# Patient Record
Sex: MALE | Race: WHITE | Employment: FULL TIME | URBAN - METROPOLITAN AREA
[De-identification: names, ages, dates, MRNs, and addresses within clinical notes are randomized per-mention and may not be internally consistent; named-entity substitution may affect disease eponyms.]

---

## 2017-01-06 ENCOUNTER — OFFICE VISIT (OUTPATIENT)
Dept: INTERNAL MEDICINE CLINIC | Age: 47
End: 2017-01-06

## 2017-01-06 VITALS
RESPIRATION RATE: 20 BRPM | HEART RATE: 52 BPM | BODY MASS INDEX: 34.07 KG/M2 | WEIGHT: 212 LBS | TEMPERATURE: 98.1 F | HEIGHT: 66 IN | OXYGEN SATURATION: 96 % | SYSTOLIC BLOOD PRESSURE: 119 MMHG | DIASTOLIC BLOOD PRESSURE: 78 MMHG

## 2017-01-06 DIAGNOSIS — J30.1 SEASONAL ALLERGIC RHINITIS DUE TO POLLEN: ICD-10-CM

## 2017-01-06 DIAGNOSIS — J45.40 MODERATE PERSISTENT ASTHMA WITHOUT COMPLICATION: Primary | ICD-10-CM

## 2017-01-06 NOTE — PROGRESS NOTES
Patient is in the office today for a 6 month follow up. Do you have an Advance Directive yes - will bring a copy       1. Have you been to the ER, urgent care clinic since your last visit? Hospitalized since your last visit? No    2. Have you seen or consulted any other health care providers outside of the [address] [place] [address] since your last visit? Include any pap smears or colon screening.  No

## 2017-01-06 NOTE — MR AVS SNAPSHOT
Visit Information Date & Time Provider Department Dept. Phone Encounter #  
 1/6/2017  4:45 PM Francisco Alvarez MD [facility] [phone] Follow-up Instructions Return in about 6 months (around 7/6/2017) for labs 1 week before. Upcoming Health Maintenance Date Due DTaP/Tdap/Td series (1 - Tdap) 9/11/1993 INFLUENZA AGE 9 TO ADULT 8/1/2016 Pneumococcal 19-64 Medium Risk (1 of 1 - PPSV23) 1/31/2017* *Topic was postponed. The date shown is not the original due date. Allergies as of 1/6/2017  Review Complete On: 1/6/2017 By: Francisco Alvarez MD  
  
 Severity Noted Reaction Type Reactions Keflex [Cephalexin]  09/29/2015    Rash Levaquin [Levofloxacin]  09/29/2015    Diarrhea, Other (comments) Bloody stools Current Immunizations  Never Reviewed No immunizations on file. Not reviewed this visit You Were Diagnosed With   
  
 Codes Comments Moderate persistent asthma without complication    -  Primary ICD-10-CM: J45.40 ICD-9-CM: 493.90 Vitals BP Pulse Temp Resp Height(growth percentile) Weight(growth percentile) 119/78 (!) 52 98.1 °F (36.7 °C) (Oral) 20 5' 6\" (1.676 m) 212 lb (96.2 kg) SpO2 BMI Smoking Status 96% 34.22 kg/m2 Never Smoker Vitals History BMI and BSA Data Body Mass Index Body Surface Area  
 34.22 kg/m 2 2.12 m 2 Preferred Pharmacy Pharmacy Name Phone [facility] [phone] [place], [address] AT [address] [phone] Your Updated Medication List  
  
   
This list is accurate as of: 1/6/17  5:20 PM.  Always use your most recent med list.  
  
  
  
  
 fexofenadine-pseudoephedrine  mg Tb12 Commonly known as:  ALLEGRA-D Take 1 Tab by mouth every twelve (12) hours. meloxicam 15 mg tablet Commonly known as:  MOBIC Take 1 Tab by mouth daily (with breakfast). montelukast 10 mg tablet Commonly known as:  SINGULAIR  
  
 NASONEX 50 mcg/actuation nasal spray Generic drug:  mometasone  
  
 omeprazole 20 mg capsule Commonly known as:  PRILOSEC Take 1 Cap by mouth daily. OTHER Allergy injections/Dr Von Lindsey QVAR 40 mcg/actuation inhaler Generic drug:  beclomethasone VENTOLIN HFA 90 mcg/actuation inhaler Generic drug:  albuterol Follow-up Instructions Return in about 6 months (around 7/6/2017) for labs 1 week before. Patient Instructions Controlling Your Asthma: Care Instructions Your Care Instructions Asthma is a long-term condition that affects your breathing. It causes the airways that lead to the lungs to swell. During an asthma attack, the airways swell and narrow. This makes it hard to breathe. You may wheeze or cough. If you have a bad attack, you may need emergency care. There are two things to do to treat asthma. · Control asthma over the long term. · Treat attacks when they occur. You and your doctor can make an asthma action plan. It tells you what medicines you need to take every day to control asthma symptoms and what to do if you have an asthma attack. Your asthma action plan can help prevent and treat attacks. When you keep your asthma under control, you can prevent severe attacks and lasting damage to your airways. You need to treat your asthma even when you are not having symptoms. Although asthma is a lifelong problem, you can still lead a full and active life. Follow-up care is a key part of your treatment and safety. Be sure to make and go to all appointments, and call your doctor if you are having problems. It's also a good idea to know your test results and keep a list of the medicines you take. How can you care for yourself at home? To control asthma over the long term Medicines Controller medicines reduce swelling in your lungs.  They also prevent asthma attacks. Take your controller medicine exactly as prescribed. Talk to your doctor if you have any problems with your medicine. · Inhaled corticosteroid is a common and effective controller medicine. Using it the right way can prevent or reduce most side effects. · Take your controller medicine every day, not just when you have symptoms. It helps prevent problems before they occur. · Your doctor may prescribe another medicine that you use along with the corticosteroid. This is often a long-acting bronchodilator. Do not take this medicine by itself. Using a long-acting bronchodilator by itself can increase your risk of a severe or fatal asthma attack. · Do not take inhaled corticosteroids or long-acting bronchodilators to stop an asthma attack that has already started. They don't work fast enough to help. · Talk to your doctor before you use other medicines. Some medicines, such as aspirin, can cause asthma attacks in some people. Education · Learn what triggers an asthma attack. Avoid these triggers when you can. Common triggers include colds, smoke, air pollution, dust, pollen, mold, pets, cockroaches, stress, and cold air. · Check yourself for asthma symptoms to know which step to follow in your action plan. Watch for things like being short of breath, having chest tightness, coughing, and wheezing. Also notice if symptoms wake you up at night or if you get tired quickly when you exercise. · If you have a peak flow meter, use it to check how well you are breathing. It can help you know when an asthma attack is going to occur. Then you can take medicine to prevent the asthma attack or make it less severe. · Do not smoke or allow others to smoke around you. Avoid smoky places. Smoking makes asthma worse. If you need help quitting, talk to your doctor about stop-smoking programs and medicines. These can increase your chances of quitting for good. · Avoid colds and the flu. Get a pneumococcal vaccine shot. If you have had one before, ask your doctor whether you need a second dose. Get a flu vaccine every year, as soon as it's available. If you must be around people with colds or the flu, wash your hands often. To treat attacks when they occur Use your asthma action plan when you have an attack. Your quick-relief medicine will stop an asthma attack. It relaxes the muscles that get tight around the airways. If your doctor prescribed corticosteroid pills to use during an attack, take them as directed. They may take hours to work, but they may shorten the attack and help you breathe better. · Albuterol is an effective quick-relief inhaler. · Take your quick-relief medicine exactly as prescribed. · Always bring your asthma medicine with you when you travel. · You may need to use quick-relief medicine before you exercise. · Call your doctor if you think you are having a problem with your medicine. When should you call for help? Call 911 anytime you think you may need emergency care. For example, call if: 
· You are having severe trouble breathing. Call your doctor now or seek immediate medical care if: 
· Your symptoms do not get better after you have followed your asthma action plan. · You cough up yellow, dark brown, or bloody mucus (sputum). Watch closely for changes in your health, and be sure to contact your doctor if: 
· Your coughing and wheezing get worse. · You need to use your quick-relief medicine on more than 2 days a week (unless it is just for exercise). · You need help figuring out what is triggering your asthma attacks. Where can you learn more? Go to http://yusuf-miesha.info/. Enter Z653 in the search box to learn more about \"Controlling Your Asthma: Care Instructions. \" Current as of: May 23, 2016 Content Version: 11.1 © 2022-9487 [employer].  Care instructions adapted under license by [address] (which disclaims liability or warranty for this information). If you have questions about a medical condition or this instruction, always ask your healthcare professional. [address] any warranty or liability for your use of this information. Introducing [facility]! Tere Mcdanielliana introduces [employer]anga patient portal. Now you can access parts of your medical record, email your doctor's office, and request medication refills online. 1. In your internet browser, go to [email or web address] 2. Click on the First Time User? Click Here link in the Sign In box. You will see the New Member Sign Up page. 3. Enter your Zaranga Access Code exactly as it appears below. You will not need to use this code after youve completed the sign-up process. If you do not sign up before the expiration date, you must request a new code. · Zaranga Access Code: [email or web address] Expires: 4/6/2017  4:23 PM 
 
4. Enter the last four digits of your Social Security Number (xxxx) and Date of Birth (mm/dd/yyyy) as indicated and click Submit. You will be taken to the next sign-up page. 5. Create a Zaranga ID. This will be your Zaranga login ID and cannot be changed, so think of one that is secure and easy to remember. 6. Create a Zaranga password. You can change your password at any time. 7. Enter your Password Reset Question and Answer. This can be used at a later time if you forget your password. 8. Enter your e-mail address. You will receive e-mail notification when new information is available in [address]. 9. Click Sign Up. You can now view and download portions of your medical record. 10. Click the Download Summary menu link to download a portable copy of your medical information. If you have questions, please visit the Frequently Asked Questions section of the Zaranga website.  Remember, [employer]anga is NOT to be used for urgent needs. For medical emergencies, dial 911. Now available from your iPhone and Android! Please provide this summary of care documentation to your next provider. Your primary care clinician is listed as DAVID MENA. If you have any questions after today's visit, please call [phone].

## 2017-01-06 NOTE — PATIENT INSTRUCTIONS
Controlling Your Asthma: Care Instructions  Your Care Instructions    Asthma is a long-term condition that affects your breathing. It causes the airways that lead to the lungs to swell. During an asthma attack, the airways swell and narrow. This makes it hard to breathe. You may wheeze or cough. If you have a bad attack, you may need emergency care. There are two things to do to treat asthma. · Control asthma over the long term. · Treat attacks when they occur. You and your doctor can make an asthma action plan. It tells you what medicines you need to take every day to control asthma symptoms and what to do if you have an asthma attack. Your asthma action plan can help prevent and treat attacks. When you keep your asthma under control, you can prevent severe attacks and lasting damage to your airways. You need to treat your asthma even when you are not having symptoms. Although asthma is a lifelong problem, you can still lead a full and active life. Follow-up care is a key part of your treatment and safety. Be sure to make and go to all appointments, and call your doctor if you are having problems. It's also a good idea to know your test results and keep a list of the medicines you take. How can you care for yourself at home? To control asthma over the long term  Medicines  Controller medicines reduce swelling in your lungs. They also prevent asthma attacks. Take your controller medicine exactly as prescribed. Talk to your doctor if you have any problems with your medicine. · Inhaled corticosteroid is a common and effective controller medicine. Using it the right way can prevent or reduce most side effects. · Take your controller medicine every day, not just when you have symptoms. It helps prevent problems before they occur. · Your doctor may prescribe another medicine that you use along with the corticosteroid. This is often a long-acting bronchodilator. Do not take this medicine by itself.  Using a long-acting bronchodilator by itself can increase your risk of a severe or fatal asthma attack. · Do not take inhaled corticosteroids or long-acting bronchodilators to stop an asthma attack that has already started. They don't work fast enough to help. · Talk to your doctor before you use other medicines. Some medicines, such as aspirin, can cause asthma attacks in some people. Education  · Learn what triggers an asthma attack. Avoid these triggers when you can. Common triggers include colds, smoke, air pollution, dust, pollen, mold, pets, cockroaches, stress, and cold air. · Check yourself for asthma symptoms to know which step to follow in your action plan. Watch for things like being short of breath, having chest tightness, coughing, and wheezing. Also notice if symptoms wake you up at night or if you get tired quickly when you exercise. · If you have a peak flow meter, use it to check how well you are breathing. It can help you know when an asthma attack is going to occur. Then you can take medicine to prevent the asthma attack or make it less severe. · Do not smoke or allow others to smoke around you. Avoid smoky places. Smoking makes asthma worse. If you need help quitting, talk to your doctor about stop-smoking programs and medicines. These can increase your chances of quitting for good. · Avoid colds and the flu. Get a pneumococcal vaccine shot. If you have had one before, ask your doctor whether you need a second dose. Get a flu vaccine every year, as soon as it's available. If you must be around people with colds or the flu, wash your hands often. To treat attacks when they occur  Use your asthma action plan when you have an attack. Your quick-relief medicine will stop an asthma attack. It relaxes the muscles that get tight around the airways. If your doctor prescribed corticosteroid pills to use during an attack, take them as directed.  They may take hours to work, but they may shorten the attack and help you breathe better. · Albuterol is an effective quick-relief inhaler. · Take your quick-relief medicine exactly as prescribed. · Always bring your asthma medicine with you when you travel. · You may need to use quick-relief medicine before you exercise. · Call your doctor if you think you are having a problem with your medicine. When should you call for help? Call 911 anytime you think you may need emergency care. For example, call if:  · You are having severe trouble breathing. Call your doctor now or seek immediate medical care if:  · Your symptoms do not get better after you have followed your asthma action plan. · You cough up yellow, dark brown, or bloody mucus (sputum). Watch closely for changes in your health, and be sure to contact your doctor if:  · Your coughing and wheezing get worse. · You need to use your quick-relief medicine on more than 2 days a week (unless it is just for exercise). · You need help figuring out what is triggering your asthma attacks. Where can you learn more? Go to http://yusuf-miesha.info/. Enter J824 in the search box to learn more about \"Controlling Your Asthma: Care Instructions. \"  Current as of: May 23, 2016  Content Version: 11.1  © 6533-9620 [employer]. Care instructions adapted under license by [employer] (which disclaims liability or warranty for this information). If you have questions about a medical condition or this instruction, always ask your healthcare professional. [address] any warranty or liability for your use of this information.

## 2017-01-10 NOTE — PROGRESS NOTES
Eden Bowling is a 42 y.o.  male and presents with Asthma (f/u exercise induced asthma take albuterol 3x/week not on QVAR better with allergy shots ) and Allergies (on singulair daily )      SUBJECTIVE:  Asthma is well controlled with pt only using albuterol as a preventative for exercise induced asthma. Pt rarely uses Qvar. Allergic rhinitis is well controlled on with allergy shots and Singulair       Respiratory ROS: negative for - shortness of breath  Cardiovascular ROS: negative for - chest pain    Current Outpatient Prescriptions   Medication Sig    OTHER Allergy injections/Dr Addie Mejia    QVAR 40 mcg/actuation inhaler     montelukast (SINGULAIR) 10 mg tablet     meloxicam (MOBIC) 15 mg tablet Take 1 Tab by mouth daily (with breakfast).  fexofenadine-pseudoephedrine (ALLEGRA-D)  mg Tb12 Take 1 Tab by mouth every twelve (12) hours.  omeprazole (PRILOSEC) 20 mg capsule Take 1 Cap by mouth daily.  VENTOLIN HFA 90 mcg/actuation inhaler     NASONEX 50 mcg/actuation nasal spray      No current facility-administered medications for this visit. OBJECTIVE:  alert, well appearing, and in no distress  Visit Vitals    /78    Pulse (!) 52    Temp 98.1 °F (36.7 °C) (Oral)    Resp 20    Ht 5' 6\" (1.676 m)    Wt 212 lb (96.2 kg)    SpO2 96%    BMI 34.22 kg/m2      well developed and well nourished  Chest - clear to auscultation, no wheezes, rales or rhonchi, symmetric air entry  Heart - normal rate, regular rhythm, normal S1, S2, no murmurs, rubs, clicks or gallops  Extremities - peripheral pulses normal, no pedal edema, no clubbing or cyanosis    Labs:   Lab Results   Component Value Date/Time    Cholesterol, total 170 05/21/2016 02:00 PM    HDL Cholesterol 64 05/21/2016 02:00 PM    LDL, calculated 92 05/21/2016 02:00 PM    Triglyceride 72 05/21/2016 02:00 PM       Lab Results   Component Value Date/Time    ALT 19 05/21/2016 02:00 PM    AST 16 05/21/2016 02:00 PM    Alk. phosphatase 48 05/21/2016 02:00 PM    Bilirubin, total 0.6 05/21/2016 02:00 PM         Discussed the patient's BMI with him. The BMI follow up plan is as follows: BMI is out of normal parameters and plan is as follows: I have counseled this patient on diet and exercise regimens. Assessment/Plan      ICD-10-CM ICD-9-CM    1. Moderate persistent asthma without complication B70.11 363.68 Well controlled on albuterol prn. Rarely uses Qvar    2. Seasonal allergic rhinitis due to pollen J30.1 477.0 Improved with allergy shots and Singulair      Follow-up Disposition:  Return in about 6 months (around 7/6/2017) for labs 1 week before. Reviewed plan of care. Patient has provided input and agrees with goals.

## 2017-02-17 ENCOUNTER — OFFICE VISIT (OUTPATIENT)
Dept: INTERNAL MEDICINE CLINIC | Age: 47
End: 2017-02-17

## 2017-02-17 VITALS
BODY MASS INDEX: 35.07 KG/M2 | DIASTOLIC BLOOD PRESSURE: 77 MMHG | HEIGHT: 66 IN | WEIGHT: 218.2 LBS | OXYGEN SATURATION: 96 % | RESPIRATION RATE: 16 BRPM | HEART RATE: 67 BPM | TEMPERATURE: 99.5 F | SYSTOLIC BLOOD PRESSURE: 125 MMHG

## 2017-02-17 DIAGNOSIS — J01.00 ACUTE NON-RECURRENT MAXILLARY SINUSITIS: Primary | ICD-10-CM

## 2017-02-17 RX ORDER — BENZONATATE 200 MG/1
200 CAPSULE ORAL
Qty: 45 CAP | Refills: 0 | Status: SHIPPED | OUTPATIENT
Start: 2017-02-17 | End: 2017-02-24

## 2017-02-17 RX ORDER — AZITHROMYCIN 250 MG/1
TABLET, FILM COATED ORAL
Qty: 6 TAB | Refills: 0 | Status: SHIPPED | OUTPATIENT
Start: 2017-02-17 | End: 2017-02-22

## 2017-02-17 NOTE — MR AVS SNAPSHOT
Visit Information Date & Time Provider Department Dept. Phone Encounter #  
 2/17/2017  3:30 PM Varsha Grey DO [facility] [id number] Follow-up Instructions Return if symptoms worsen or fail to improve. Your Appointments 6/30/2017  8:00 AM  
LAB with John Vega MD  
[facility] (--) Appt Note: 6 mos labs [address] [address] [phone]  
  
   
 [address] [address]  
  
    
 7/7/2017  8:00 AM  
ROUTINE CARE with John Vega MD  
[facility] (--) Appt Note: 6 mos fu per NH  
 [address] [id number] [phone]  
  
   
 [address] [address] Upcoming Health Maintenance Date Due Pneumococcal 19-64 Medium Risk (1 of 1 - PPSV23) 9/11/1991 DTaP/Tdap/Td series (1 - Tdap) 9/11/1993 INFLUENZA AGE 9 TO ADULT 8/1/2016 Allergies as of 2/17/2017  Review Complete On: 2/17/2017 By: Varsha Grey DO Severity Noted Reaction Type Reactions Keflex [Cephalexin]  09/29/2015    Rash Levaquin [Levofloxacin]  09/29/2015    Diarrhea, Other (comments) Bloody stools Current Immunizations  Never Reviewed No immunizations on file. Not reviewed this visit You Were Diagnosed With   
  
 Codes Comments Acute non-recurrent maxillary sinusitis    -  Primary ICD-10-CM: J01.00 ICD-9-CM: 461.0 Vitals BP Pulse Temp Resp Height(growth percentile) Weight(growth percentile) 125/77 (BP 1 Location: Left arm) 67 99.5 °F (37.5 °C) (Oral) 16 5' 6\" (1.676 m) 218 lb 3.2 oz (99 kg) SpO2 BMI Smoking Status 96% 35.22 kg/m2 Never Smoker Vitals History BMI and BSA Data Body Mass Index Body Surface Area  
 35.22 kg/m 2 2.15 m 2 Preferred Pharmacy Pharmacy Name Phone [phone] - [address], [address] [phone] Your Updated Medication List  
  
   
This list is accurate as of: 2/17/17  4:27 PM.  Always use your most recent med list.  
  
  
  
  
 azithromycin 250 mg tablet Commonly known as:  Apurva Nadeem Take 2 tablets today, then take 1 tablet daily  
  
 benzonatate 200 mg capsule Commonly known as:  TESSALON Take 1 Cap by mouth three (3) times daily as needed for Cough for up to 7 days. fexofenadine-pseudoephedrine  mg Tb12 Commonly known as:  ALLEGRA-D Take 1 Tab by mouth every twelve (12) hours. meloxicam 15 mg tablet Commonly known as:  MOBIC Take 1 Tab by mouth daily (with breakfast). montelukast 10 mg tablet Commonly known as:  SINGULAIR  
  
 NASONEX 50 mcg/actuation nasal spray Generic drug:  mometasone  
  
 omeprazole 20 mg capsule Commonly known as:  PRILOSEC Take 1 Cap by mouth daily. OTHER Allergy injections/Dr Addie Mejia QVAR 40 mcg/actuation inhaler Generic drug:  beclomethasone VENTOLIN HFA 90 mcg/actuation inhaler Generic drug:  albuterol Prescriptions Sent to Pharmacy Refills  
 azithromycin (ZITHROMAX) 250 mg tablet 0 Sig: Take 2 tablets today, then take 1 tablet daily Class: Normal  
 Pharmacy: [facility] [address] [place] [address] RD AT [address] Ph #: [phone]  
 benzonatate (TESSALON) 200 mg capsule 0 Sig: Take 1 Cap by mouth three (3) times daily as needed for Cough for up to 7 days. Class: Normal  
 Pharmacy: [facility] [address] [place] [address] [address] Ph #: [phone] Route: Oral  
  
Follow-up Instructions Return if symptoms worsen or fail to improve. Patient Instructions Sinusitis: Care Instructions Your Care Instructions Sinusitis is an infection of the lining of the sinus cavities in your head. Sinusitis often follows a cold. It causes pain and pressure in your head and face. In most cases, sinusitis gets better on its own in 1 to 2 weeks. But some mild symptoms may last for several weeks. Sometimes antibiotics are needed. Follow-up care is a key part of your treatment and safety. Be sure to make and go to all appointments, and call your doctor if you are having problems. It's also a good idea to know your test results and keep a list of the medicines you take. How can you care for yourself at home? · Take an over-the-counter pain medicine, such as acetaminophen (Tylenol), ibuprofen (Advil, Motrin), or naproxen (Aleve). Read and follow all instructions on the label. · If the doctor prescribed antibiotics, take them as directed. Do not stop taking them just because you feel better. You need to take the full course of antibiotics. · Be careful when taking over-the-counter cold or flu medicines and Tylenol at the same time. Many of these medicines have acetaminophen, which is Tylenol. Read the labels to make sure that you are not taking more than the recommended dose. Too much acetaminophen (Tylenol) can be harmful. · Breathe warm, moist air from a steamy shower, a hot bath, or a sink filled with hot water. Avoid cold, dry air. Using a humidifier in your home may help. Follow the directions for cleaning the machine. · Use saline (saltwater) nasal washes to help keep your nasal passages open and wash out mucus and bacteria. You can buy saline nose drops at a grocery store or drugstore. Or you can make your own at home by adding 1 teaspoon of salt and 1 teaspoon of baking soda to 2 cups of distilled water. If you make your own, fill a bulb syringe with the solution, insert the tip into your nostril, and squeeze gently. [place] your nose.  
· Put a hot, wet towel or a warm gel pack on your face 3 or 4 times a day for 5 to 10 minutes each time. · Try a decongestant nasal spray like oxymetazoline (Afrin). Do not use it for more than 3 days in a row. Using it for more than 3 days can make your congestion worse. When should you call for help? Call your doctor now or seek immediate medical care if: 
· You have new or worse swelling or redness in your face or around your eyes. · You have a new or higher fever. Watch closely for changes in your health, and be sure to contact your doctor if: 
· You have new or worse facial pain. · The mucus from your nose becomes thicker (like pus) or has new blood in it. · You are not getting better as expected. Where can you learn more? Go to http://yusuf-miesha.info/. Enter X671 in the search box to learn more about \"Sinusitis: Care Instructions. \" Current as of: July 29, 2016 Content Version: 11.1 © 7993-3754 [employer]. Care instructions adapted under license by [employer] (which disclaims liability or warranty for this information). If you have questions about a medical condition or this instruction, always ask your healthcare professional. [address] any warranty or liability for your use of this information. Introducing [facility] [place] [facility]! Denzel Mcdonnell introduces [employer] patient portal. Now you can access parts of your medical record, email your doctor's office, and request medication refills online. 1. In your internet browser, go to [email or web address] 2. Click on the First Time User? Click Here link in the Sign In box. You will see the New Member Sign Up page. 3. Enter your [employer] Access Code exactly as it appears below. You will not need to use this code after youve completed the sign-up process. If you do not sign up before the expiration date, you must request a new code. · [employer] Access Code: [email or web address] Expires: 4/6/2017  4:23 PM 
 
 4. Enter the last four digits of your Social Security Number (xxxx) and Date of Birth (mm/dd/yyyy) as indicated and click Submit. You will be taken to the next sign-up page. 5. Create a [employer] ID. This will be your [employer] login ID and cannot be changed, so think of one that is secure and easy to remember. 6. Create a [employer] password. You can change your password at any time. 7. Enter your Password Reset Question and Answer. This can be used at a later time if you forget your password. 8. Enter your e-mail address. You will receive e-mail notification when new information is available in [address] 9. Click Sign Up. You can now view and download portions of your medical record. 10. Click the Download Summary menu link to download a portable copy of your medical information. If you have questions, please visit the Frequently Asked Questions section of the [employer] website. Remember, [employer] is NOT to be used for urgent needs. For medical emergencies, dial 911. Now available from your iPhone and Android! Please provide this summary of care documentation to your next provider. Your primary care clinician is listed as DAVID MENA. If you have any questions after today's visit, please call [phone].

## 2017-02-17 NOTE — PROGRESS NOTES
Pt is here for possible sinus infection x 4 days    Do you have an advance directive yes    Pt  mychart activation pending    1. Have you been to the ER, urgent care clinic since your last visit? Hospitalized since your last visit? No    2. Have you seen or consulted any other health care providers outside of the [address] [place] [address] since your last visit? Include any pap smears or colon screening.  Yes Dr Bernabe-allergy injections monthly

## 2017-02-17 NOTE — PATIENT INSTRUCTIONS
Sinusitis: Care Instructions  Your Care Instructions    Sinusitis is an infection of the lining of the sinus cavities in your head. Sinusitis often follows a cold. It causes pain and pressure in your head and face. In most cases, sinusitis gets better on its own in 1 to 2 weeks. But some mild symptoms may last for several weeks. Sometimes antibiotics are needed. Follow-up care is a key part of your treatment and safety. Be sure to make and go to all appointments, and call your doctor if you are having problems. It's also a good idea to know your test results and keep a list of the medicines you take. How can you care for yourself at home? · Take an over-the-counter pain medicine, such as acetaminophen (Tylenol), ibuprofen (Advil, Motrin), or naproxen (Aleve). Read and follow all instructions on the label. · If the doctor prescribed antibiotics, take them as directed. Do not stop taking them just because you feel better. You need to take the full course of antibiotics. · Be careful when taking over-the-counter cold or flu medicines and Tylenol at the same time. Many of these medicines have acetaminophen, which is Tylenol. Read the labels to make sure that you are not taking more than the recommended dose. Too much acetaminophen (Tylenol) can be harmful. · Breathe warm, moist air from a steamy shower, a hot bath, or a sink filled with hot water. Avoid cold, dry air. Using a humidifier in your home may help. Follow the directions for cleaning the machine. · Use saline (saltwater) nasal washes to help keep your nasal passages open and wash out mucus and bacteria. You can buy saline nose drops at a grocery store or drugstore. Or you can make your own at home by adding 1 teaspoon of salt and 1 teaspoon of baking soda to 2 cups of distilled water. If you make your own, fill a bulb syringe with the solution, insert the tip into your nostril, and squeeze gently. [place] your nose.   · Put a hot, wet towel or a warm gel pack on your face 3 or 4 times a day for 5 to 10 minutes each time. · Try a decongestant nasal spray like oxymetazoline (Afrin). Do not use it for more than 3 days in a row. Using it for more than 3 days can make your congestion worse. When should you call for help? Call your doctor now or seek immediate medical care if:  · You have new or worse swelling or redness in your face or around your eyes. · You have a new or higher fever. Watch closely for changes in your health, and be sure to contact your doctor if:  · You have new or worse facial pain. · The mucus from your nose becomes thicker (like pus) or has new blood in it. · You are not getting better as expected. Where can you learn more? Go to http://yusuf-miesha.info/. Enter V988 in the search box to learn more about \"Sinusitis: Care Instructions. \"  Current as of: July 29, 2016  Content Version: 11.1  © [id number] [employer]. Care instructions adapted under license by [employer] (which disclaims liability or warranty for this information). If you have questions about a medical condition or this instruction, always ask your healthcare professional. [place] [address] any warranty or liability for your use of this information.

## 2017-02-22 NOTE — PROGRESS NOTES
HISTORY OF PRESENT ILLNESS  Nomi Espino is a 42 y.o. male. HPI  42year old established male patient in today for an acute concern    He reports 4 days of \"the crud,\" he reports fevers and chills last night. He has also had voice changes. He has had green sputum production. Erum oviedo which has not help    Hx of recurrent sinus infection, he is now on allergy shots  He reports sick contacts in his family    Allergies   Allergen Reactions    Keflex [Cephalexin] Rash    Levaquin [Levofloxacin] Diarrhea and Other (comments)     Bloody stools         Past Medical History   Diagnosis Date    Asthma     H/O: pneumonia        Family History   Problem Relation Age of Onset    Hypertension Father     Stroke Father     Heart Failure Father     Diabetes Father     Alzheimer Father        Social History   Substance Use Topics    Smoking status: Never Smoker    Smokeless tobacco: Never Used    Alcohol use No        Current Outpatient Prescriptions   Medication Sig    azithromycin (ZITHROMAX) 250 mg tablet Take 2 tablets today, then take 1 tablet daily    benzonatate (TESSALON) 200 mg capsule Take 1 Cap by mouth three (3) times daily as needed for Cough for up to 7 days.  fexofenadine-pseudoephedrine (ALLEGRA-D)  mg Tb12 Take 1 Tab by mouth every twelve (12) hours.  OTHER Allergy injections/Dr Von Lindsey    NASONEX 50 mcg/actuation nasal spray     montelukast (SINGULAIR) 10 mg tablet     VENTOLIN HFA 90 mcg/actuation inhaler     QVAR 40 mcg/actuation inhaler      No current facility-administered medications for this visit.          Past Surgical History   Procedure Laterality Date    Pr abdomen surgery proc unlisted      Hx cholecystectomy  2005       ROS  See HPI    Visit Vitals    /77 (BP 1 Location: Left arm)    Pulse 67    Temp 99.5 °F (37.5 °C) (Oral)    Resp 16    Ht 5' 6\" (1.676 m)    Wt 218 lb 3.2 oz (99 kg)    SpO2 96%    BMI 35.22 kg/m2     Physical Exam   HENT: Right Ear: No middle ear effusion. Left Ear:  No middle ear effusion. Nose: Mucosal edema present. No rhinorrhea. Right sinus exhibits no maxillary sinus tenderness and no frontal sinus tenderness. Left sinus exhibits no maxillary sinus tenderness and no frontal sinus tenderness. Mouth/Throat: No posterior oropharyngeal erythema. Cardiovascular: Normal rate and regular rhythm. Pulmonary/Chest: Breath sounds normal.       ASSESSMENT and PLAN    ICD-10-CM ICD-9-CM    1. Acute non-recurrent maxillary sinusitis J01.00 461.0 azithromycin (ZITHROMAX) 250 mg tablet      benzonatate (TESSALON) 200 mg capsule     -Advised symptomatic care  -RTC prn    Additional Instructions: The patient understands that they should contact the office at any time if any questions or concerns develop. They are also aware that they can call our main office number at [phone] at any time if they would like to address any concerns with the physician. They also understand that they should dial 911 if any acute emergency arises. The patient understands that they should give us a minimum of 48 hours to complete prescription refills once they are requested. The patient has also been instructed to contact us by calling the main office number if they have not received feedback within 2 weeks of having any tests completed. The patient is a aware that they should read all package insert information when picking up the medications and that they should consult the pharmacist of a physician if they have any questions or concerns regarding the prescribed medications. Discussed with the patient new medications given and patient instructed to read pharmacy literature regarding side effects and drug interactions. Instructions for taking the medications were provided to the patient and the consequences of not taking it. Follow-up Disposition:   Return if symptoms worsen or fail to improve.    Risk and benefits of new medication discussed in detail when indicated, patient was given the opportunity to ask questions   AVS provided  reviewed diet, exercise and weight control when indicated  Alarm signals discussed. ER precautions reviewed when indicated  Plan of care reviewed with patient. Understanding verbalized and they are in agreement with plan of care.      Ameya Goldberg, DO

## 2017-05-18 ENCOUNTER — OFFICE VISIT (OUTPATIENT)
Dept: INTERNAL MEDICINE CLINIC | Age: 47
End: 2017-05-18

## 2017-05-18 VITALS
RESPIRATION RATE: 16 BRPM | SYSTOLIC BLOOD PRESSURE: 114 MMHG | BODY MASS INDEX: 34.55 KG/M2 | WEIGHT: 215 LBS | HEART RATE: 54 BPM | OXYGEN SATURATION: 97 % | TEMPERATURE: 98.1 F | HEIGHT: 66 IN | DIASTOLIC BLOOD PRESSURE: 65 MMHG

## 2017-05-18 DIAGNOSIS — J06.9 UPPER RESPIRATORY TRACT INFECTION, UNSPECIFIED TYPE: Primary | ICD-10-CM

## 2017-05-18 RX ORDER — AZITHROMYCIN 250 MG/1
TABLET, FILM COATED ORAL
Qty: 6 TAB | Refills: 0 | Status: SHIPPED | OUTPATIENT
Start: 2017-05-18 | End: 2017-05-23

## 2017-05-18 NOTE — MR AVS SNAPSHOT
Visit Information Date & Time Provider Department Dept. Phone Encounter #  
 5/18/2017  3:00 PM Michael Estrada DO [facility] [phone] Follow-up Instructions Return if symptoms worsen or fail to improve. Your Appointments 6/30/2017  8:00 AM  
LAB with Levy Herrera MD  
[facility] (--) Appt Note: 6 mos labs [address] [id number] [phone]  
  
   
 [address] [address]  
  
    
 7/7/2017  8:00 AM  
ROUTINE CARE with Levy Herrera MD  
[facility] (--) Appt Note: 6 mos fu per NH  
 [address] [id number] [phone]  
  
   
 [address] [place] [address],[address] [address] Upcoming Health Maintenance Date Due Pneumococcal 19-64 Medium Risk (1 of 1 - PPSV23) 9/11/1991 DTaP/Tdap/Td series (1 - Tdap) 9/11/1993 INFLUENZA AGE 9 TO ADULT 8/1/2017 Allergies as of 5/18/2017  Review Complete On: 5/18/2017 By: Michael Estrada DO Severity Noted Reaction Type Reactions Keflex [Cephalexin]  09/29/2015    Rash Levaquin [Levofloxacin]  09/29/2015    Diarrhea, Other (comments) Bloody stools Current Immunizations  Never Reviewed No immunizations on file. Not reviewed this visit You Were Diagnosed With   
  
 Codes Comments Upper respiratory tract infection, unspecified type    -  Primary ICD-10-CM: J06.9 ICD-9-CM: 465.9 Vitals BP Pulse Temp Resp Height(growth percentile) Weight(growth percentile) 114/65 (!) 54 98.1 °F (36.7 °C) (Oral) 16 5' 6\" (1.676 m) 215 lb (97.5 kg) SpO2 BMI Smoking Status 97% 34.7 kg/m2 Never Smoker Vitals History BMI and BSA Data Body Mass Index Body Surface Area 34.7 kg/m 2 2.13 m 2 Preferred Pharmacy Pharmacy Name Phone [phone] - [place], [address] [phone] Your Updated Medication List  
  
   
This list is accurate as of: 5/18/17  3:27 PM.  Always use your most recent med list.  
  
  
  
  
 azithromycin 250 mg tablet Commonly known as:  Randolph Leslie Take 2 tablets today, then take 1 tablet daily  
  
 fexofenadine-pseudoephedrine  mg Tb12 Commonly known as:  ALLEGRA-D Take 1 Tab by mouth every twelve (12) hours. montelukast 10 mg tablet Commonly known as:  SINGULAIR  
  
 NASONEX 50 mcg/actuation nasal spray Generic drug:  mometasone OMEPRAZOLE PO Take 20 mg by mouth daily. OTHER Allergy injections/Dr Estefani Landry QVAR 40 mcg/actuation [facility] Generic drug:  beclomethasone VENTOLIN HFA 90 mcg/actuation inhaler Generic drug:  albuterol Prescriptions Printed Refills  
 azithromycin (ZITHROMAX) 250 mg tablet 0 Sig: Take 2 tablets today, then take 1 tablet daily Class: Print Follow-up Instructions Return if symptoms worsen or fail to improve. Patient Instructions Upper Respiratory Infection (Cold): Care Instructions Your Care Instructions An upper respiratory infection, or URI, is an infection of the nose, sinuses, or throat. URIs are spread by coughs, sneezes, and direct contact. The common cold is the most frequent kind of URI. The flu and sinus infections are other kinds of URIs. Almost all URIs are caused by viruses. Antibiotics won't cure them. But you can treat most infections with home care. This may include drinking lots of fluids and taking over-the-counter pain medicine. You will probably feel better in 4 to 10 days. The doctor has checked you carefully, but problems can develop later. If you notice any problems or new symptoms, get medical treatment right away. Follow-up care is a key part of your treatment and safety.  Be sure to make and go to all appointments, and call your doctor if you are having problems. It's also a good idea to know your test results and keep a list of the medicines you take. How can you care for yourself at home? · To prevent dehydration, drink plenty of fluids, enough so that your urine is light yellow or clear like water. Choose water and other caffeine-free clear liquids until you feel better. If you have kidney, heart, or liver disease and have to limit fluids, talk with your doctor before you increase the amount of fluids you drink. · Take an over-the-counter pain medicine, such as acetaminophen (Tylenol), ibuprofen (Advil, Motrin), or naproxen (Aleve). Read and follow all instructions on the label. · Before you use cough and cold medicines, check the label. These medicines may not be safe for young children or for people with certain health problems. · Be careful when taking over-the-counter cold or flu medicines and Tylenol at the same time. Many of these medicines have acetaminophen, which is Tylenol. Read the labels to make sure that you are not taking more than the recommended dose. Too much acetaminophen (Tylenol) can be harmful. · Get plenty of rest. 
· Do not smoke or allow others to smoke around you. If you need help quitting, talk to your doctor about stop-smoking programs and medicines. These can increase your chances of quitting for good. When should you call for help? Call 911 anytime you think you may need emergency care. For example, call if: 
· You have severe trouble breathing. Call your doctor now or seek immediate medical care if: 
· You seem to be getting much sicker. · You have new or worse trouble breathing. · You have a new or higher fever. · You have a new rash. Watch closely for changes in your health, and be sure to contact your doctor if: 
· You have a new symptom, such as a sore throat, an earache, or sinus pain. · You cough more deeply or more often, especially if you notice more mucus or a change in the color of your mucus. · You do not get better as expected. Where can you learn more? Go to http://yusuf-miesha.info/. Enter C053 in the search box to learn more about \"Upper Respiratory Infection (Cold): Care Instructions. \" Current as of: June 30, 2016 Content Version: 11.2 © 2673-6315 [employer]. Care instructions adapted under license by [employer] (which disclaims liability or warranty for this information). If you have questions about a medical condition or this instruction, always ask your healthcare professional. [address] any warranty or liability for your use of this information. Introducing [facility]! Marcie Hodgson introduces Screenmailer patient portal. Now you can access parts of your medical record, email your doctor's office, and request medication refills online. 1. In your internet browser, go to [email or web address] 2. Click on the First Time User? Click Here link in the Sign In box. You will see the New Member Sign Up page. 3. Enter your Screenmailer Access Code exactly as it appears below. You will not need to use this code after youve completed the sign-up process. If you do not sign up before the expiration date, you must request a new code. · Screenmailer Access Code: [email or web address] Expires: 8/16/2017  3:27 PM 
 
4. Enter the last four digits of your Social Security Number (xxxx) and Date of Birth (mm/dd/yyyy) as indicated and click Submit. You will be taken to the next sign-up page. 5. Create a Voucht ID. This will be your Screenmailer login ID and cannot be changed, so think of one that is secure and easy to remember. 6. Create a Screenmailer password. You can change your password at any time. 7. Enter your Password Reset Question and Answer.  This can be used at a later time if you forget your password. 8. Enter your e-mail address. You will receive e-mail notification when new information is available in [address] 9. Click Sign Up. You can now view and download portions of your medical record. 10. Click the Download Summary menu link to download a portable copy of your medical information. If you have questions, please visit the Frequently Asked Questions section of the [employer] website. Remember, [employer] is NOT to be used for urgent needs. For medical emergencies, dial 911. Now available from your iPhone and Android! Please provide this summary of care documentation to your next provider. Your primary care clinician is listed as DAVID MENA. If you have any questions after today's visit, please call [phone].

## 2017-05-18 NOTE — PATIENT INSTRUCTIONS

## 2017-05-18 NOTE — PROGRESS NOTES
Pt is here for sinus pain, congestion, productive cough w/green sputum x 3 days      Pt declined mychart activation. 1. Have you been to the ER, urgent care clinic since your last visit? Hospitalized since your last visit? No    2. Have you seen or consulted any other health care providers outside of the [address] [place] [address] since your last visit? Include any pap smears or colon screening.  Yes  [employer]

## 2017-05-18 NOTE — PROGRESS NOTES
HISTORY OF PRESENT ILLNESS  Kyle Malhotra is a 42 y.o. male. Sinus Pain        42year old established male patient in today for an acute concern    He reports 3 days of sinus symptoms, he denies fevers, chills or sweats. He has also had cough spells, green sputum and PND. He has been using tessalon perls, allergy medications, Qvar, ventolin, singular and nasonex. Hx of recurrent sinus infection, he is now on allergy shots, he says he started a new formulation last week    Recent GI bug last week, lasted 72 hours. 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 He has also been compliant with omeprazole which has helped GI symptoms. Allergies   Allergen Reactions    Keflex [Cephalexin] Rash    Levaquin [Levofloxacin] Diarrhea and Other (comments)     Bloody stools         Past Medical History:   Diagnosis Date    Asthma     H/O: pneumonia        Family History   Problem Relation Age of Onset    Hypertension Father     Stroke Father     Heart Failure Father     Diabetes Father     Alzheimer Father        Social History   Substance Use Topics    Smoking status: Never Smoker    Smokeless tobacco: Never Used    Alcohol use No        Current Outpatient Prescriptions   Medication Sig    OMEPRAZOLE PO Take 20 mg by mouth daily.  azithromycin (ZITHROMAX) 250 mg tablet Take 2 tablets today, then take 1 tablet daily    fexofenadine-pseudoephedrine (ALLEGRA-D)  mg Tb12 Take 1 Tab by mouth every twelve (12) hours.  OTHER Allergy injections/Dr Louis Lucero    VENTOLIN HFA 90 mcg/actuation inhaler     QVAR 40 mcg/actuation inhaler     NASONEX 50 mcg/actuation nasal spray     montelukast (SINGULAIR) 10 mg tablet      No current facility-administered medications for this visit.          Past Surgical History:   Procedure Laterality Date    ABDOMEN SURGERY PROC UNLISTED      HX CHOLECYSTECTOMY  2005       ROS  See HPI    Visit Vitals    /65    Pulse (!) 54    Temp 98.1 °F (36.7 °C) (Oral)    Resp 16    Ht 5' 6\" (1.676 m)    Wt 215 lb (97.5 kg)    SpO2 97%    BMI 34.7 kg/m2     Physical Exam   HENT:   Right Ear: No middle ear effusion. Left Ear:  No middle ear effusion. Nose: Mucosal edema present. No rhinorrhea. Right sinus exhibits no maxillary sinus tenderness and no frontal sinus tenderness. Left sinus exhibits no maxillary sinus tenderness and no frontal sinus tenderness. Mouth/Throat: No posterior oropharyngeal erythema. Cardiovascular: Normal rate and regular rhythm. Pulmonary/Chest: Breath sounds normal.       ASSESSMENT and PLAN    ICD-10-CM ICD-9-CM    1. Upper respiratory tract infection, unspecified type J06.9 465.9      -hx recurrent symptoms. Delayed abx script provided, patient to fill on Sunday if symptoms persists. -Advised symptomatic care  -PCP to address HM  -RTC prn    Additional Instructions: The patient understands that they should contact the office at any time if any questions or concerns develop. They are also aware that they can call our main office number at [phone] at any time if they would like to address any concerns with the physician. They also understand that they should dial 911 if any acute emergency arises. The patient understands that they should give us a minimum of 48 hours to complete prescription refills once they are requested. The patient has also been instructed to contact us by calling the main office number if they have not received feedback within 2 weeks of having any tests completed. The patient is a aware that they should read all package insert information when picking up the medications and that they should consult the pharmacist of a physician if they have any questions or concerns regarding the prescribed medications.   Discussed with the patient new medications given and patient instructed to read pharmacy literature regarding side effects and drug interactions. Instructions for taking the medications were provided to the patient and the consequences of not taking it. Follow-up Disposition:   Return if symptoms worsen or fail to improve. Risk and benefits of new medication discussed in detail when indicated, patient was given the opportunity to ask questions   AVS provided  reviewed diet, exercise and weight control when indicated  Alarm signals discussed. ER precautions reviewed when indicated  Plan of care reviewed with patient. Understanding verbalized and they are in agreement with plan of care.      Lena Brown, DO

## 2017-06-30 ENCOUNTER — LAB ONLY (OUTPATIENT)
Dept: INTERNAL MEDICINE CLINIC | Age: 47
End: 2017-06-30

## 2017-06-30 DIAGNOSIS — Z00.00 PHYSICAL EXAM: Primary | ICD-10-CM

## 2017-06-30 DIAGNOSIS — E55.9 VITAMIN D DEFICIENCY: ICD-10-CM

## 2017-07-01 LAB
25(OH)D3 SERPL-MCNC: 32.2 NG/ML (ref 32–100)
A-G RATIO,AGRAT: 1.8 RATIO (ref 1.1–2.6)
ABSOLUTE LYMPHOCYTE COUNT, 10803: 2.3 K/UL (ref 1–4.8)
ALBUMIN SERPL-MCNC: 4.4 G/DL (ref 3.5–5)
ALP SERPL-CCNC: 48 U/L (ref 25–115)
ALT SERPL-CCNC: 34 U/L (ref 5–40)
ANION GAP SERPL CALC-SCNC: 16 MMOL/L
AST SERPL W P-5'-P-CCNC: 38 U/L (ref 10–37)
BASOPHILS # BLD: 0 K/UL (ref 0–0.2)
BASOPHILS NFR BLD: 0 % (ref 0–2)
BILIRUB SERPL-MCNC: 0.8 MG/DL (ref 0.2–1.2)
BUN SERPL-MCNC: 13 MG/DL (ref 6–22)
CALCIUM SERPL-MCNC: 8.8 MG/DL (ref 8.4–10.4)
CHLORIDE SERPL-SCNC: 101 MMOL/L (ref 98–110)
CHOLEST SERPL-MCNC: 163 MG/DL (ref 110–200)
CO2 SERPL-SCNC: 24 MMOL/L (ref 20–32)
CREAT SERPL-MCNC: 1 MG/DL (ref 0.5–1.2)
EOSINOPHIL # BLD: 0.1 K/UL (ref 0–0.5)
EOSINOPHIL NFR BLD: 1 % (ref 0–6)
ERYTHROCYTE [DISTWIDTH] IN BLOOD BY AUTOMATED COUNT: 14.8 % (ref 10–16)
GFRAA, 66117: >60
GFRNA, 66118: >60
GLOBULIN,GLOB: 2.4 G/DL (ref 2–4)
GLUCOSE SERPL-MCNC: 89 MG/DL (ref 65–99)
GRANULOCYTES,GRANS: 49 % (ref 40–75)
HCT VFR BLD AUTO: 45.1 % (ref 36.6–51.9)
HDLC SERPL-MCNC: 68 MG/DL (ref 40–59)
HGB BLD-MCNC: 14.3 G/DL (ref 13.2–17.3)
LDLC SERPL CALC-MCNC: 79 MG/DL (ref 50–99)
LYMPHOCYTES, LYMLT: 42 % (ref 27–45)
MCH RBC QN AUTO: 30 PG (ref 26–34)
MCHC RBC AUTO-ENTMCNC: 32 G/DL (ref 32–36)
MCV RBC AUTO: 94 FL (ref 80–95)
MONOCYTES # BLD: 0.4 K/UL (ref 0.1–0.9)
MONOCYTES NFR BLD: 8 % (ref 3–9)
NEUTROPHILS # BLD AUTO: 2.7 K/UL (ref 1.8–7.7)
PLATELET # BLD AUTO: 247 K/UL (ref 140–440)
PMV BLD AUTO: 10.9 FL (ref 6–10.8)
POTASSIUM SERPL-SCNC: 4.4 MMOL/L (ref 3.5–5.5)
PROT SERPL-MCNC: 6.8 G/DL (ref 6.4–8.3)
RBC # BLD AUTO: 4.78 M/UL (ref 3.8–5.8)
SODIUM SERPL-SCNC: 141 MMOL/L (ref 133–145)
TRIGL SERPL-MCNC: 80 MG/DL (ref 40–149)
VLDLC SERPL CALC-MCNC: 16 MG/DL (ref 8–30)
WBC # BLD AUTO: 5.5 K/UL (ref 4–11)

## 2017-07-07 ENCOUNTER — OFFICE VISIT (OUTPATIENT)
Dept: INTERNAL MEDICINE CLINIC | Age: 47
End: 2017-07-07

## 2017-07-07 VITALS
TEMPERATURE: 98 F | OXYGEN SATURATION: 98 % | HEART RATE: 65 BPM | HEIGHT: 66 IN | DIASTOLIC BLOOD PRESSURE: 63 MMHG | RESPIRATION RATE: 20 BRPM | WEIGHT: 215 LBS | BODY MASS INDEX: 34.55 KG/M2 | SYSTOLIC BLOOD PRESSURE: 121 MMHG

## 2017-07-07 DIAGNOSIS — J45.30 MILD PERSISTENT ASTHMA WITHOUT COMPLICATION: ICD-10-CM

## 2017-07-07 DIAGNOSIS — J30.1 SEASONAL ALLERGIC RHINITIS DUE TO POLLEN: ICD-10-CM

## 2017-07-07 DIAGNOSIS — N52.9 ERECTILE DYSFUNCTION, UNSPECIFIED ERECTILE DYSFUNCTION TYPE: ICD-10-CM

## 2017-07-07 DIAGNOSIS — Z00.00 ROUTINE MEDICAL EXAM: Primary | ICD-10-CM

## 2017-07-07 NOTE — PROGRESS NOTES
Patient is in the office today for a 6 month follow up. 1. Have you been to the ER, urgent care clinic since your last visit? Hospitalized since your last visit? No    2. Have you seen or consulted any other health care providers outside of the [address] [place] [address] since your last visit?   Include any pap smears or colon screening. yes, Dr. Claudette Johansen.

## 2017-07-07 NOTE — PATIENT INSTRUCTIONS
Advance Care Planning: Care Instructions  Your Care Instructions  It can be hard to live with an illness that cannot be cured. But if your health is getting worse, you may want to make decisions about end-of-life care. Planning for the end of your life does not mean that you are giving up. It is a way to make sure that your wishes are met. Clearly stating your wishes can make it easier for your loved ones. Making plans while you are still able may also ease your mind and make your final days less stressful and more meaningful. Follow-up care is a key part of your treatment and safety. Be sure to make and go to all appointments, and call your doctor if you are having problems. It's also a good idea to know your test results and keep a list of the medicines you take. What can you do to plan for the end of life? · You can bring these issues up with your doctor. You do not need to wait until your doctor starts the conversation. You might start with \"I would not be willing to live with . Betha Lute Betha Lute Betha Lute \" When you complete this sentence it helps your doctor understand your wishes. · Talk openly and honestly with your doctor. This is the best way to understand the decisions you will need to make as your health changes. Know that you can always change your mind. · Ask your doctor about commonly used life-support measures. These include tube feedings, breathing machines, and fluids given through a vein (IV). Understanding these treatments will help you decide whether you want them. · You may choose to have these life-supporting treatments for a limited time. This allows a trial period to see whether they will help you. You may also decide that you want your doctor to take only certain measures to keep you alive. It is important to spell out these conditions so that your doctor and family understand them. · Talk to your doctor about how long you are likely to live.  He or she may be able to give you an idea of what usually happens with your specific illness. · Think about preparing papers that state your wishes. This way there will not be any confusion about what you want. You can change your instructions at any time. Which papers should you prepare? Advance directives are legal papers that tell doctors how you want to be cared for at the end of your life. You do not need a  to write these papers. Ask your doctor or your state health department for information on how to write your advance directives. They may have the forms for each of these types of papers. Make sure your doctor has a copy of these on file, and give a copy to a family member or close friend. · Consider a do-not-resuscitate order (DNR). This order asks that no extra treatments be done if your heart stops or you stop breathing. Extra treatments may include cardiopulmonary resuscitation (CPR), electrical shock to restart your heart, or a machine to breathe for you. If you decide to have a DNR order, ask your doctor to explain and write it. Place the order in your home where everyone can easily see it. · Consider a living will. A living will explains your wishes about life support and other treatments at the end of your life if you become unable to speak for yourself. Living rosales tell doctors to use or not use treatments that would keep you alive. You must have one or two witnesses or a notary present when you sign this form. · Consider a durable power of  for health care. This allows you to name a person to make decisions about your care if you are not able to. Most people ask a close friend or family member. Talk to this person about the kinds of treatments you want and those that you do not want. Make sure this person understands your wishes. These legal papers are simple to change. Tell your doctor what you want to change, and ask him or her to make a note in your medical file. Give your family updated copies of the papers.   Where can you learn more?  Go to http://yusuf-miesha.info/. Enter P184 in the search box to learn more about \"Advance Care Planning: Care Instructions. \"  Current as of: November 17, 2016  Content Version: 11.3  © 1869-0537 [employer]. Care instructions adapted under license by [employer] (which disclaims liability or warranty for this information). If you have questions about a medical condition or this instruction, always ask your healthcare professional. [address] any warranty or liability for your use of this information.

## 2017-07-07 NOTE — MR AVS SNAPSHOT
Visit Information Date & Time Provider Department Dept. Phone Encounter #  
 7/7/2017  8:00 AM Pallavi Jacques MD [facility] at [facility] [phone] Follow-up Instructions Return in about 1 year (around 7/7/2018) for physical, labs 1 week before. Upcoming Health Maintenance Date Due Pneumococcal 19-64 Medium Risk (1 of 1 - PPSV23) 9/11/1991 DTaP/Tdap/Td series (1 - Tdap) 9/11/1993 INFLUENZA AGE 9 TO ADULT 8/1/2017 Allergies as of 7/7/2017  Review Complete On: 7/7/2017 By: Pallavi Jacques MD  
  
 Severity Noted Reaction Type Reactions Keflex [Cephalexin]  09/29/2015    Rash Levaquin [Levofloxacin]  09/29/2015    Diarrhea, Other (comments) Bloody stools Current Immunizations  Never Reviewed No immunizations on file. Not reviewed this visit You Were Diagnosed With   
  
 Codes Comments Physical exam    -  Primary ICD-10-CM: Z00.00 ICD-9-CM: V70.9 Vitals BP Pulse Temp Resp Height(growth percentile) Weight(growth percentile) 121/63 (BP 1 Location: Left arm, BP Patient Position: Sitting) 65 98 °F (36.7 °C) (Oral) 20 5' 6\" (1.676 m) 215 lb (97.5 kg) SpO2 BMI Smoking Status 98% 34.7 kg/m2 Never Smoker Vitals History BMI and BSA Data Body Mass Index Body Surface Area 34.7 kg/m 2 2.13 m 2 Preferred Pharmacy [phone] - [address], [address] AT [address] [facility] [address] [phone] Your Updated Medication List  
  
   
This list is accurate as of: 7/7/17  8:32 AM.  Always use your most recent med list.  
  
  
  
  
 fexofenadine-pseudoephedrine  mg Tb12 Commonly known as:  ALLEGRA-D Take 1 Tab by mouth every twelve (12) hours. montelukast 10 mg tablet Commonly known as:  SINGULAIR  
  
 NASONEX 50 mcg/actuation nasal spray Generic drug:  mometasone OMEPRAZOLE PO Take 20 mg by mouth daily. OTHER Allergy injections/Dr Mu Walters QVAR 40 mcg/actuation [employer] Generic drug:  beclomethasone VENTOLIN HFA 90 mcg/actuation inhaler Generic drug:  albuterol Follow-up Instructions Return in about 1 year (around 7/7/2018) for physical, labs 1 week before. Patient Instructions Advance Care Planning: Care Instructions Your Care Instructions It can be hard to live with an illness that cannot be cured. But if your health is getting worse, you may want to make decisions about end-of-life care. Planning for the end of your life does not mean that you are giving up. It is a way to make sure that your wishes are met. Clearly stating your wishes can make it easier for your loved ones. Making plans while you are still able may also ease your mind and make your final days less stressful and more meaningful. Follow-up care is a key part of your treatment and safety. Be sure to make and go to all appointments, and call your doctor if you are having problems. It's also a good idea to know your test results and keep a list of the medicines you take. What can you do to plan for the end of life? · You can bring these issues up with your doctor. You do not need to wait until your doctor starts the conversation. You might start with \"I would not be willing to live with . Bruce Rene Carrrandi Rene Carrrandi Rene \" When you complete this sentence it helps your doctor understand your wishes. · Talk openly and honestly with your doctor. This is the best way to understand the decisions you will need to make as your health changes. Know that you can always change your mind. · Ask your doctor about commonly used life-support measures. These include tube feedings, breathing machines, and fluids given through a vein (IV). Understanding these treatments will help you decide whether you want them.  
· You may choose to have these life-supporting treatments for a limited time. This allows a trial period to see whether they will help you. You may also decide that you want your doctor to take only certain measures to keep you alive. It is important to spell out these conditions so that your doctor and family understand them. · Talk to your doctor about how long you are likely to live. He or she may be able to give you an idea of what usually happens with your specific illness. · Think about preparing papers that state your wishes. This way there will not be any confusion about what you want. You can change your instructions at any time. Which papers should you prepare? Advance directives are legal papers that tell doctors how you want to be cared for at the end of your life. You do not need a  to write these papers. Ask your doctor or your state [facility] department for information on how to write your advance directives. They may have the forms for each of these types of papers. Make sure your doctor has a copy of these on file, and give a copy to a family member or close friend. · Consider a do-not-resuscitate order (DNR). This order asks that no extra treatments be done if your heart stops or you stop breathing. Extra treatments may include cardiopulmonary resuscitation (CPR), electrical shock to restart your heart, or a machine to breathe for you. If you decide to have a DNR order, ask your doctor to explain and write it. Place the order in your home where everyone can easily see it. · Consider a living will. A living will explains your wishes about life support and other treatments at the end of your life if you become unable to speak for yourself. Living rosales tell doctors to use or not use treatments that would keep you alive. You must have one or two witnesses or a notary present when you sign this form. · Consider a durable power of  for health care. This allows you to name a person to make decisions about your care if you are not able to. Most people ask a close friend or family member. Talk to this person about the kinds of treatments you want and those that you do not want. Make sure this person understands your wishes. These legal papers are simple to change. Tell your doctor what you want to change, and ask him or her to make a note in your medical file. Give your family updated copies of the papers. Where can you learn more? Go to http://yusuf-miesha.info/. Enter P184 in the search box to learn more about \"Advance Care Planning: Care Instructions. \" Current as of: November 17, 2016 Content Version: 11.3 © 1541-7674 [employer]. Care instructions adapted under license by [employer] (which disclaims liability or warranty for this information). If you have questions about a medical condition or this instruction, always ask your healthcare professional. [address] any warranty or liability for your use of this information. Introducing [facility]! Clarissa Flores introduces [employer] patient portal. Now you can access parts of your medical record, email your doctor's office, and request medication refills online. 1. In your internet browser, go to [email or web address] News Online 2. Click on the First Time User? Click Here link in the Sign In box. You will see the New Member Sign Up page. 3. Enter your [employer] Access Code exactly as it appears below. You will not need to use this code after youve completed the sign-up process. If you do not sign up before the expiration date, you must request a new code. · [employer] Access Code: [email or web address] Expires: 8/16/2017  3:27 PM 
 
4. Enter the last four digits of your Social Security Number (xxxx) and Date of Birth (mm/dd/yyyy) as indicated and click Submit. You will be taken to the next sign-up page. 5. Create a Contix ID.  This will be your [employer] login ID and cannot be changed, so think of one that is secure and easy to remember. 6. Create a [employer] password. You can change your password at any time. 7. Enter your Password Reset Question and Answer. This can be used at a later time if you forget your password. 8. Enter your e-mail address. You will receive e-mail notification when new information is available in [address] 9. Click Sign Up. You can now view and download portions of your medical record. 10. Click the Download Summary menu link to download a portable copy of your medical information. If you have questions, please visit the Frequently Asked Questions section of the [employer] website. Remember, [employer] is NOT to be used for urgent needs. For medical emergencies, dial 911. Now available from your iPhone and Android! Please provide this summary of care documentation to your next provider. Your primary care clinician is listed as DAVID MENA. If you have any questions after today's visit, please call [phone].

## 2017-07-08 NOTE — PROGRESS NOTES
Subjective:   Steffany Cisneros is a 42 y.o. male presenting for his annual checkup. ROS:  Feeling well. No dyspnea or chest pain on exertion. No abdominal pain, change in bowel habits, black or bloody stools. No urinary tract or prostatic symptoms. No neurological complaints. Specific concerns today: pt's allergies and asthma is well controlled on Singulair. Pt rarely uses Qvar for asthma. Pt uses Nasal steroids and oral antihistamines as needed for allergies. Pt has had some mild ED that worsened when he tried OTC medication for BPH and ED. Pt was advised to stop it and see if severe ED resolves. f he continues to have problems will try Viagra. Patient Active Problem List   Diagnosis Code    H/O: pneumonia Z87.01    Asthma J45.909    Sinus bradycardia on ECG R00.1    Gastritis K29.70     Current Outpatient Prescriptions   Medication Sig Dispense Refill    OTHER Allergy injections/Dr Chiqui Capone      montelukast (SINGULAIR) 10 mg tablet   0    OMEPRAZOLE PO Take 20 mg by mouth daily.  fexofenadine-pseudoephedrine (ALLEGRA-D)  mg Tb12 Take 1 Tab by mouth every twelve (12) hours.       VENTOLIN HFA 90 mcg/actuation inhaler   4    QVAR 40 mcg/actuation inhaler   10    NASONEX 50 mcg/actuation nasal spray   11        Lab Results   Component Value Date/Time    WBC 5.5 06/30/2017 08:05 AM    HGB 14.3 06/30/2017 08:05 AM    HCT 45.1 06/30/2017 08:05 AM    PLATELET 213 92/88/8159 08:05 AM    MCV 94 06/30/2017 08:05 AM     Lab Results   Component Value Date/Time    Cholesterol, total 163 06/30/2017 08:05 AM    HDL Cholesterol 68 06/30/2017 08:05 AM    LDL, calculated 79 06/30/2017 08:05 AM    Triglyceride 80 06/30/2017 08:05 AM     Lab Results   Component Value Date/Time    Sodium 141 06/30/2017 08:05 AM    Potassium 4.4 06/30/2017 08:05 AM    Chloride 101 06/30/2017 08:05 AM    CO2 24 06/30/2017 08:05 AM    Anion gap 16.0 06/30/2017 08:05 AM    Glucose 89 06/30/2017 08:05 AM    BUN 13 06/30/2017 08:05 AM    Creatinine 1.0 06/30/2017 08:05 AM    Calcium 8.8 06/30/2017 08:05 AM    Bilirubin, total 0.8 06/30/2017 08:05 AM    ALT (SGPT) 34 06/30/2017 08:05 AM    AST (SGOT) 38 06/30/2017 08:05 AM    Alk. phosphatase 48 06/30/2017 08:05 AM    Protein, total 6.8 06/30/2017 08:05 AM    Albumin 4.4 06/30/2017 08:05 AM    Globulin 2.4 06/30/2017 08:05 AM    A-G Ratio 1.8 06/30/2017 08:05 AM                 Objective:   Visit Vitals    /63 (BP 1 Location: Left arm, BP Patient Position: Sitting)    Pulse 65    Temp 98 °F (36.7 °C) (Oral)    Resp 20    Ht 5' 6\" (1.676 m)    Wt 215 lb (97.5 kg)    SpO2 98%    BMI 34.7 kg/m2     The patient appears well, alert, oriented x 3, in no distress. ENT normal.  Neck supple. No adenopathy or thyromegaly. JOHN. Lungs are clear, good air entry, no wheezes, rhonchi or rales. S1 and S2 normal, no murmurs, regular rate and rhythm. Abdomen is soft without tenderness, guarding, mass or organomegaly. Extremities show no edema, normal peripheral pulses. Neurological is normal without focal findings. Assessment/Plan:   healthy adult male  lose weight, increase physical activity, call if any problems. ICD-10-CM ICD-9-CM    1. Routine medical exam I26.23 B56.2 METABOLIC PANEL, COMPREHENSIVE      LIPID PANEL      CBC WITH AUTOMATED DIFF   2. Mild persistent asthma without complication K27.18 595.06 Well controlled on Singulair and Qvar. 3. Erectile dysfunction, unspecified erectile dysfunction type N52.9 607.84 Consider  Viagra if not improving off OTC supplement for ED. 4. Seasonal allergic rhinitis due to pollen J30.1 477.0 Well controlled on Nasonex NS and allegra D    . Follow-up Disposition:  Return in about 1 year (around 7/7/2018) for physical, labs 1 week before. Reviewed plan of care. Patient has provided input and agrees with goals.

## 2018-02-28 ENCOUNTER — HOSPITAL ENCOUNTER (OUTPATIENT)
Dept: GENERAL RADIOLOGY | Age: 48
Discharge: HOME | End: 2018-02-28
Payer: COMMERCIAL

## 2018-02-28 ENCOUNTER — OFFICE VISIT (OUTPATIENT)
Dept: FAMILY MEDICINE CLINIC | Age: 48
End: 2018-02-28

## 2018-02-28 VITALS
HEIGHT: 66 IN | WEIGHT: 216.6 LBS | BODY MASS INDEX: 34.81 KG/M2 | DIASTOLIC BLOOD PRESSURE: 88 MMHG | OXYGEN SATURATION: 98 % | HEART RATE: 53 BPM | RESPIRATION RATE: 20 BRPM | SYSTOLIC BLOOD PRESSURE: 156 MMHG | TEMPERATURE: 98.2 F

## 2018-02-28 DIAGNOSIS — J30.89 ENVIRONMENTAL AND SEASONAL ALLERGIES: ICD-10-CM

## 2018-02-28 DIAGNOSIS — R10.84 GENERALIZED ABDOMINAL PAIN: ICD-10-CM

## 2018-02-28 DIAGNOSIS — K52.9 GASTROENTERITIS: Primary | ICD-10-CM

## 2018-02-28 PROCEDURE — 74018 RADEX ABDOMEN 1 VIEW: CPT

## 2018-02-28 RX ORDER — CIPROFLOXACIN 500 MG/1
500 TABLET ORAL 2 TIMES DAILY
Qty: 20 TAB | Refills: 0 | Status: SHIPPED | OUTPATIENT
Start: 2018-02-28 | End: 2018-04-02

## 2018-02-28 RX ORDER — MOMETASONE FUROATE 50 UG/1
2 SPRAY, METERED NASAL DAILY
Qty: 1 CONTAINER | Refills: 11 | Status: SHIPPED | OUTPATIENT
Start: 2018-02-28

## 2018-02-28 NOTE — MR AVS SNAPSHOT
[facility] 
 
 
 [address] [address] [address] [facility] [address] 
[phone] Patient: Gina Jones MRN: [id number] LAF:[id number] Visit Information Date & Time Provider Department Dept. Phone Encounter #  
 2/28/2018  2:45 PM Lexis Ceballos [facility] [id number] Upcoming Health Maintenance Date Due DTaP/Tdap/Td series (1 - Tdap) 9/11/1993 Allergies as of 2/28/2018  Review Complete On: 2/28/2018 By: Lexis Ceballos NP Severity Noted Reaction Type Reactions Keflex [Cephalexin]  09/29/2015    Rash Levaquin [Levofloxacin]  09/29/2015    Diarrhea, Other (comments) Bloody stools Current Immunizations  Never Reviewed No immunizations on file. Not reviewed this visit You Were Diagnosed With   
  
 Codes Comments Gastroenteritis    -  Primary ICD-10-CM: K52.9 ICD-9-CM: 558.9 Generalized abdominal pain     ICD-10-CM: R10.84 ICD-9-CM: 789.07 Environmental and seasonal allergies     ICD-10-CM: J30.89 ICD-9-CM: 477.8 Vitals BP Pulse Temp Resp Height(growth percentile) Weight(growth percentile) 156/88 (BP 1 Location: Right arm, BP Patient Position: Sitting) (!) 53 98.2 °F (36.8 °C) (Oral) 20 5' 6\" (1.676 m) 216 lb 9.6 oz (98.2 kg) SpO2 BMI Smoking Status 98% 34.96 kg/m2 Never Smoker BMI and BSA Data Body Mass Index Body Surface Area 34.96 kg/m 2 2.14 m 2 Preferred Pharmacy Pharmacy Name Phone [phone] - [place], [address] AT [address] [phone] Your Updated Medication List  
  
   
This list is accurate as of 2/28/18  3:23 PM.  Always use your most recent med list.  
  
  
  
  
 ciprofloxacin HCl 500 mg tablet Commonly known as:  CIPRO Take 1 Tab by mouth two (2) times a day. fexofenadine-pseudoephedrine  mg Tb12 Commonly known as:  ALLEGRA-D  
 Take 1 Tab by mouth every twelve (12) hours. mometasone 50 mcg/actuation nasal spray Commonly known as:  NASONEX  
2 Sprays by Both Nostrils route daily. montelukast 10 mg tablet Commonly known as:  SINGULAIR  
  
 OMEPRAZOLE PO Take 20 mg by mouth daily. OTHER Allergy injections/Dr Edwin Rubin QVAR 40 mcg/actuation [employer] Generic drug:  beclomethasone VENTOLIN HFA 90 mcg/actuation inhaler Generic drug:  albuterol Prescriptions Sent to Pharmacy Refills  
 mometasone (NASONEX) 50 mcg/actuation nasal spray 11 Si Sprays by Both Nostrils route daily. Class: Normal  
 Pharmacy: [facility] [address] [place] [address] AT [address] [place] [address] [address] Ph #: [phone] Route: Both Nostrils  
 ciprofloxacin HCl (CIPRO) 500 mg tablet 0 Sig: Take 1 Tab by mouth two (2) times a day. Class: Normal  
 Pharmacy: [facility] [address] [place] [address] AT [address] [place] [address] Rd [address] Ph #: [phone] Route: Oral  
  
To-Do List   
 2018 Imaging:  XR ABD (KUB) Introducing [facility]! Sushma Parker introduces [employer] patient portal. Now you can access parts of your medical record, email your doctor's office, and request medication refills online. 1. In your internet browser, go to [email or web address] 2. Click on the First Time User? Click Here link in the Sign In box. You will see the New Member Sign Up page. 3. Enter your [employer] Access Code exactly as it appears below. You will not need to use this code after youve completed the sign-up process. If you do not sign up before the expiration date, you must request a new code. · [employer] Access Code: [email or web address] Expires: 2018  3:01 PM 
 
4. Enter the last four digits of your Social Security Number (xxxx) and Date of Birth (mm/dd/yyyy) as indicated and click Submit.  You will be taken to the next sign-up page. 5. Create a [employer] ID. This will be your [employer] login ID and cannot be changed, so think of one that is secure and easy to remember. 6. Create a [employer] password. You can change your password at any time. 7. Enter your Password Reset Question and Answer. This can be used at a later time if you forget your password. 8. Enter your e-mail address. You will receive e-mail notification when new information is available in [address] 9. Click Sign Up. You can now view and download portions of your medical record. 10. Click the Download Summary menu link to download a portable copy of your medical information. If you have questions, please visit the Frequently Asked Questions section of the [employer] website. Remember, [employer] is NOT to be used for urgent needs. For medical emergencies, dial 911. Now available from your iPhone and Android! Please provide this summary of care documentation to your next provider. Your primary care clinician is listed as DAVID MENA. If you have any questions after today's visit, please call [phone].

## 2018-02-28 NOTE — LETTER
NOTIFICATION RETURN TO WORK / SCHOOL 
 
2/28/2018 3:26 PM 
 
Mr. Elias Cuellar [address] [facility] [address] [address] To Whom It May Concern: 
 
E[facility] Cuellar [facility] the [facility] He was seen today for sick visit. Please excuse for 2-28-18 and 3-1-18. If there are questions or concerns please have the patient contact our office. Sincerely, Kenney Salgado NP

## 2018-02-28 NOTE — PROGRESS NOTES
HISTORY OF PRESENT ILLNESS  Frank Laguerre is a 41 y.o. male. Patient presents today for abdominal pain. HPI  This started Sunday night. He ate pizza about 5:30 and the pain started about 11pm  The biggest concern was that he has not had a bowel movement until today and today it was like diarrhea and it is green in color. He has a tightness across his abdomen. He has had food poisoning before. He has had his gallbladder removed. Review of Systems   Constitutional: Negative. Respiratory: Negative. Cardiovascular: Negative. Gastrointestinal: Positive for abdominal pain, constipation and diarrhea. Negative for blood in stool, heartburn, nausea and vomiting. Visit Vitals    /88 (BP 1 Location: Right arm, BP Patient Position: Sitting)    Pulse (!) 53    Temp 98.2 °F (36.8 °C) (Oral)    Resp 20    Ht 5' 6\" (1.676 m)    Wt 216 lb 9.6 oz (98.2 kg)    SpO2 98%    BMI 34.96 kg/m2       Physical Exam   Constitutional: He appears well-developed. No distress. Cardiovascular: Normal rate, regular rhythm and normal heart sounds. No murmur heard. Pulmonary/Chest: Effort normal and breath sounds normal. No respiratory distress. He has no wheezes. He has no rales. Abdominal: Soft. Bowel sounds are normal. He exhibits no distension and no mass. There is tenderness. There is no rebound and no guarding. ASSESSMENT and PLAN    ICD-10-CM ICD-9-CM    1. Gastroenteritis K52.9 558.9 ciprofloxacin HCl (CIPRO) 500 mg tablet   2. Generalized abdominal pain R10.84 789.07 XR ABD (KUB)   3. Environmental and seasonal allergies J30.89 477.8 mometasone (NASONEX) 50 mcg/actuation nasal spray     PLAN:  Pt given x-ray  Pt given Cipro and can take both doses today with plenty of water. Pt given after visit summary.

## 2018-02-28 NOTE — PROGRESS NOTES
1. Have you been to the ER, urgent care clinic since your last visit? Hospitalized since your last visit? No    2. Have you seen or consulted any other health care providers outside of the [address] [place] [address] since your last visit? Include any pap smears or colon screening.  No

## 2018-03-01 NOTE — PROGRESS NOTES
Please advise Pt that there is no obstruction but there is a moderate amount of stool. He can try a laxative to clear thjis out.

## 2018-03-02 ENCOUNTER — TELEPHONE (OUTPATIENT)
Dept: FAMILY MEDICINE CLINIC | Age: 48
End: 2018-03-02

## 2018-03-02 NOTE — TELEPHONE ENCOUNTER
Pt cb and was advised of message below and pt asked if he should continue antibiotic. After speaking with NP Brady Iraheta pt should continue antibiotic. Pt understood well and denied having further questions.

## 2018-03-02 NOTE — TELEPHONE ENCOUNTER
----- Message from Neila Leventhal, NP sent at 3/1/2018  8:14 AM EST -----  Please advise Pt that there is no obstruction but there is a moderate amount of stool. He can try a laxative to clear thjis out.

## 2018-04-02 ENCOUNTER — OFFICE VISIT (OUTPATIENT)
Dept: FAMILY MEDICINE CLINIC | Age: 48
End: 2018-04-02

## 2018-04-02 VITALS
DIASTOLIC BLOOD PRESSURE: 76 MMHG | HEART RATE: 63 BPM | WEIGHT: 220 LBS | RESPIRATION RATE: 18 BRPM | SYSTOLIC BLOOD PRESSURE: 118 MMHG | HEIGHT: 66 IN | BODY MASS INDEX: 35.36 KG/M2 | OXYGEN SATURATION: 97 % | TEMPERATURE: 98 F

## 2018-04-02 DIAGNOSIS — R10.84 GENERALIZED ABDOMINAL PAIN: Primary | ICD-10-CM

## 2018-04-02 RX ORDER — BISMUTH SUBSALICYLATE 262 MG
1 TABLET,CHEWABLE ORAL DAILY
COMMUNITY

## 2018-04-02 NOTE — MR AVS SNAPSHOT
[address] [facility] 
 
 
 [address] [place] [address] [address] 
[phone] Patient: Mariya Carrillo MRN: [id number] Visit Information Date & Time Provider Department Dept. Phone Encounter #  
 4/2/2018  8:30 AM Verner Flor, NP Cedrick Chairez [facility] [id number] Upcoming Health Maintenance Date Due DTaP/Tdap/Td series (1 - Tdap) 9/11/1993 Allergies as of 4/2/2018  Review Complete On: 4/2/2018 By: Verner Flor, NP Severity Noted Reaction Type Reactions Keflex [Cephalexin]  09/29/2015    Rash Levaquin [Levofloxacin]  09/29/2015    Diarrhea, Other (comments) Bloody stools Current Immunizations  Never Reviewed No immunizations on file. Not reviewed this visit You Were Diagnosed With   
  
 Codes Comments Generalized abdominal pain    -  Primary ICD-10-CM: R10.84 ICD-9-CM: 789.07 Vitals BP Pulse Temp Resp Height(growth percentile) Weight(growth percentile) 118/76 63 98 °F (36.7 °C) 18 5' 6\" (1.676 m) 220 lb (99.8 kg) SpO2 BMI Smoking Status 97% 35.51 kg/m2 Never Smoker BMI and BSA Data Body Mass Index Body Surface Area 35.51 kg/m 2 2.16 m 2 Preferred Pharmacy Pharmacy Name Phone [phone] - [place], [address] AT [address] [phone] Your Updated Medication List  
  
   
This list is accurate as of 4/2/18  9:02 AM.  Always use your most recent med list.  
  
  
  
  
 fexofenadine-pseudoephedrine  mg Tb12 Commonly known as:  ALLEGRA-D Take 1 Tab by mouth every twelve (12) hours. mometasone 50 mcg/actuation nasal spray Commonly known as:  NASONEX  
2 Sprays by Both Nostrils route daily. montelukast 10 mg tablet Commonly known as:  SINGULAIR  
  
 multivitamin tablet Commonly known as:  ONE A DAY Take 1 Tab by mouth daily.   
  
 OTHER  
 Allergy injections/Dr Juan Carlos Rico QVAR 40 mcg/actuation [employer] Generic drug:  beclomethasone VENTOLIN HFA 90 mcg/actuation inhaler Generic drug:  albuterol To-Do List   
 04/02/2018 Imaging:  CT ABD PELV W WO CONT Referral Information Referral ID Referred By Referred To  
  
 [id number] [facility] Not Available Visits Status Start Date End Date 1 New Request 4/2/18 4/2/19 If your referral has a status of pending review or denied, additional information will be sent to support the outcome of this decision. Introducing [facility]! [facility] introduces [employer] patient portal. Now you can access parts of your medical record, email your doctor's office, and request medication refills online. 1. In your internet browser, go to [email or web address] 2. Click on the First Time User? Click Here link in the Sign In box. You will see the New Member Sign Up page. 3. Enter your [employer] Access Code exactly as it appears below. You will not need to use this code after youve completed the sign-up process. If you do not sign up before the expiration date, you must request a new code. · [employer] Access Code: [email or web address] Expires: 5/29/2018  4:01 PM 
 
4. Enter the last four digits of your Social Security Number (xxxx) and Date of Birth (mm/dd/yyyy) as indicated and click Submit. You will be taken to the next sign-up page. 5. Create a Super[employer]ocust ID. This will be your [employer] login ID and cannot be changed, so think of one that is secure and easy to remember. 6. Create a [employer] password. You can change your password at any time. 7. Enter your Password Reset Question and Answer. This can be used at a later time if you forget your password. 8. Enter your e-mail address. You will receive e-mail notification when new information is available in [address]. 9. Click Sign Up.  You can now view and download portions of your medical record. 10. Click the Download Summary menu link to download a portable copy of your medical information. If you have questions, please visit the Frequently Asked Questions section of the [employer] website. Remember, [employer] is NOT to be used for urgent needs. For medical emergencies, dial 911. Now available from your iPhone and Android! Please provide this summary of care documentation to your next provider. Your primary care clinician is listed as DAVID MENA. If you have any questions after today's visit, please call [phone].

## 2018-04-02 NOTE — PROGRESS NOTES
Chief Complaint   Patient presents with    GI Problem     follow up     1. Have you been to the ER, urgent care clinic since your last visit? Hospitalized since your last visit? No    2. Have you seen or consulted any other health care providers outside of the [address] [place] [address] since your last visit? Include any pap smears or colon screening.  No

## 2018-04-06 ENCOUNTER — HOSPITAL ENCOUNTER (OUTPATIENT)
Dept: CT IMAGING | Age: 48
Discharge: HOME | End: 2018-04-06
Attending: NURSE PRACTITIONER
Payer: COMMERCIAL

## 2018-04-06 DIAGNOSIS — R93.89 ABNORMAL FINDINGS ON IMAGING TEST: Primary | ICD-10-CM

## 2018-04-06 DIAGNOSIS — R10.84 GENERALIZED ABDOMINAL PAIN: ICD-10-CM

## 2018-04-06 LAB — CREAT UR-MCNC: 1.2 MG/DL (ref 0.6–1.3)

## 2018-04-06 PROCEDURE — 74177 CT ABD & PELVIS W/CONTRAST: CPT

## 2018-04-06 PROCEDURE — 82565 ASSAY OF CREATININE: CPT

## 2018-04-06 PROCEDURE — 74011636320 HC RX REV CODE- 636/320: Performed by: NURSE PRACTITIONER

## 2018-04-06 RX ADMIN — IOPAMIDOL 100 ML: 612 INJECTION, SOLUTION INTRAVENOUS at 07:00

## 2018-04-09 ENCOUNTER — TELEPHONE (OUTPATIENT)
Dept: FAMILY MEDICINE CLINIC | Age: 48
End: 2018-04-09

## 2018-04-09 NOTE — TELEPHONE ENCOUNTER
----- Message from Boby Cuevas NP sent at 4/6/2018 12:59 PM EDT -----  Please let Pt know that I ordered a MRCP as radiology would like further imaging of his pancrease

## 2018-07-08 DIAGNOSIS — Z00.00 ROUTINE MEDICAL EXAM: ICD-10-CM

## 2018-08-15 LAB
A-G RATIO,AGRAT: 1.8 RATIO (ref 1.1–2.6)
ABSOLUTE LYMPHOCYTE COUNT, 10803: 2.2 K/UL (ref 1–4.8)
ALBUMIN SERPL-MCNC: 4.2 G/DL (ref 3.5–5)
ALP SERPL-CCNC: 48 U/L (ref 25–115)
ALT SERPL-CCNC: 14 U/L (ref 5–40)
ANION GAP SERPL CALC-SCNC: 15 MMOL/L
AST SERPL W P-5'-P-CCNC: 17 U/L (ref 10–37)
BASOPHILS # BLD: 0 K/UL (ref 0–0.2)
BASOPHILS NFR BLD: 0 % (ref 0–2)
BILIRUB SERPL-MCNC: 0.5 MG/DL (ref 0.2–1.2)
BUN SERPL-MCNC: 11 MG/DL (ref 6–22)
CALCIUM SERPL-MCNC: 9.4 MG/DL (ref 8.4–10.4)
CHLORIDE SERPL-SCNC: 101 MMOL/L (ref 98–110)
CHOLEST SERPL-MCNC: 167 MG/DL (ref 110–200)
CO2 SERPL-SCNC: 27 MMOL/L (ref 20–32)
CREAT SERPL-MCNC: 1 MG/DL (ref 0.5–1.2)
EOSINOPHIL # BLD: 0.1 K/UL (ref 0–0.5)
EOSINOPHIL NFR BLD: 1 % (ref 0–6)
ERYTHROCYTE [DISTWIDTH] IN BLOOD BY AUTOMATED COUNT: 13.5 % (ref 10–15.5)
GFRAA, 66117: >60
GFRNA, 66118: >60
GLOBULIN,GLOB: 2.3 G/DL (ref 2–4)
GLUCOSE SERPL-MCNC: 98 MG/DL (ref 70–99)
GRANULOCYTES,GRANS: 58 % (ref 40–75)
HCT VFR BLD AUTO: 42.8 % (ref 39.3–51.6)
HDLC SERPL-MCNC: 3 MG/DL (ref 0–5)
HDLC SERPL-MCNC: 55 MG/DL (ref 40–59)
HGB BLD-MCNC: 14.5 G/DL (ref 13.1–17.2)
LDLC SERPL CALC-MCNC: 94 MG/DL (ref 50–99)
LYMPHOCYTES, LYMLT: 32 % (ref 20–45)
MCH RBC QN AUTO: 31 PG (ref 26–34)
MCHC RBC AUTO-ENTMCNC: 34 G/DL (ref 31–36)
MCV RBC AUTO: 91 FL (ref 80–95)
MONOCYTES # BLD: 0.6 K/UL (ref 0.1–1)
MONOCYTES NFR BLD: 8 % (ref 3–12)
NEUTROPHILS # BLD AUTO: 3.9 K/UL (ref 1.8–7.7)
PLATELET # BLD AUTO: 260 K/UL (ref 140–440)
PMV BLD AUTO: 11.1 FL (ref 9–13)
POTASSIUM SERPL-SCNC: 4.2 MMOL/L (ref 3.5–5.5)
PROT SERPL-MCNC: 6.5 G/DL (ref 6.4–8.3)
RBC # BLD AUTO: 4.73 M/UL (ref 3.8–5.8)
SODIUM SERPL-SCNC: 143 MMOL/L (ref 133–145)
TRIGL SERPL-MCNC: 87 MG/DL (ref 40–149)
VLDLC SERPL CALC-MCNC: 17 MG/DL (ref 8–30)
WBC # BLD AUTO: 6.8 K/UL (ref 4–11)

## 2018-08-21 ENCOUNTER — OFFICE VISIT (OUTPATIENT)
Dept: FAMILY MEDICINE CLINIC | Age: 48
End: 2018-08-21

## 2018-08-21 VITALS
HEIGHT: 66 IN | SYSTOLIC BLOOD PRESSURE: 130 MMHG | TEMPERATURE: 98.2 F | WEIGHT: 208 LBS | BODY MASS INDEX: 33.43 KG/M2 | HEART RATE: 53 BPM | OXYGEN SATURATION: 97 % | RESPIRATION RATE: 20 BRPM | DIASTOLIC BLOOD PRESSURE: 79 MMHG

## 2018-08-21 DIAGNOSIS — J45.20 MILD INTERMITTENT ASTHMA WITHOUT COMPLICATION: ICD-10-CM

## 2018-08-21 DIAGNOSIS — Z00.00 PHYSICAL EXAM: Primary | ICD-10-CM

## 2018-08-21 DIAGNOSIS — J30.1 SEASONAL ALLERGIC RHINITIS DUE TO POLLEN: ICD-10-CM

## 2018-08-21 RX ORDER — SILDENAFIL 100 MG/1
100 TABLET, FILM COATED ORAL
Qty: 10 TAB | Refills: 5 | Status: SHIPPED | OUTPATIENT
Start: 2018-08-21 | End: 2019-08-27 | Stop reason: SDUPTHER

## 2018-08-21 NOTE — PROGRESS NOTES
Patient is in the office today for a physical.      1. Have you been to the ER, urgent care clinic since your last visit? Hospitalized since your last visit? No    2. Have you seen or consulted any other health care providers outside of the [address] [place] [address] since your last visit? Include any pap smears or colon screening.  No

## 2018-08-21 NOTE — MR AVS SNAPSHOT
[address] [place] [address] 
 
 
 [address] [address] [address] [address] 
[phone] Patient: Rina Padilla MRN: [id number] Visit Information Date & Time Provider Department Dept. Phone Encounter #  
 8/21/2018 11:00 AM Anila Esparza, [address] [place] [address] [phone] [id number] Follow-up Instructions Return in about 1 year (around 8/21/2019) for physical, labs 1 week before. Upcoming Health Maintenance Date Due DTaP/Tdap/Td series (1 - Tdap) 9/11/1993 Influenza Age 5 to Adult 8/1/2018 Allergies as of 8/21/2018  Review Complete On: 8/21/2018 By: Anila Esparza MD  
  
 Severity Noted Reaction Type Reactions Keflex [Cephalexin]  09/29/2015    Rash Levaquin [Levofloxacin]  09/29/2015    Diarrhea, Other (comments) Bloody stools Current Immunizations  Never Reviewed No immunizations on file. Not reviewed this visit You Were Diagnosed With   
  
 Codes Comments Physical exam    -  Primary ICD-10-CM: Z00.00 ICD-9-CM: V70.9 Mild intermittent asthma without complication     DXP-43-XR: J45.20 ICD-9-CM: 493.90 Seasonal allergic rhinitis due to pollen     ICD-10-CM: J30.1 ICD-9-CM: 477.0 Vitals BP Pulse Temp Resp Height(growth percentile) Weight(growth percentile) 130/79 (BP 1 Location: Left arm, BP Patient Position: Sitting) (!) 53 98.2 °F (36.8 °C) (Oral) 20 5' 6\" (1.676 m) 208 lb (94.3 kg) SpO2 BMI Smoking Status 97% 33.57 kg/m2 Never Smoker BMI and BSA Data Body Mass Index Body Surface Area  
 33.57 kg/m 2 2.1 m 2 Preferred Pharmacy Pharmacy Name Phone [phone] - [address], [address] AT [address] [phone] Your Updated Medication List  
  
   
This list is accurate as of 8/21/18 11:25 AM.  Always use your most recent med list.  
  
  
  
  
 fexofenadine-pseudoephedrine  mg Tb12 Commonly known as:  ALLEGRA-D Take 1 Tab by mouth every twelve (12) hours. mometasone 50 mcg/actuation nasal spray Commonly known as:  NASONEX  
2 Sprays by Both Nostrils route daily. montelukast 10 mg tablet Commonly known as:  SINGULAIR  
  
 multivitamin tablet Commonly known as:  ONE A DAY Take 1 Tab by mouth daily. OTHER Allergy injections/Dr Antonio Mariee QVAR 40 mcg/actuation [employer] Generic drug:  beclomethasone  
  
 sildenafil citrate 100 mg tablet Commonly known as:  VIAGRA Take 1 Tab by mouth daily as needed. VENTOLIN HFA 90 mcg/actuation inhaler Generic drug:  albuterol Prescriptions Printed Refills  
 sildenafil citrate (VIAGRA) 100 mg tablet 5 Sig: Take 1 Tab by mouth daily as needed. Class: Print Route: Oral  
  
Follow-up Instructions Return in about 1 year (around 8/21/2019) for physical, labs 1 week before. Patient Instructions Advance Directives: Care Instructions Your Care Instructions An advance directive is a legal way to state your wishes at the end of your life. It tells your family and your doctor what to do if you can no longer say what you want. There are two main types of advance directives. You can change them any time that your wishes change. · A living will tells your family and your doctor your wishes about life support and other treatment. · A durable power of  for health care lets you name a person to make treatment decisions for you when you can't speak for yourself. This person is called a health care agent. If you do not have an advance directive, decisions about your medical care may be made by a doctor or a  who doesn't know you. It may help to think of an advance directive as a gift to the people who care for you. If you have one, they won't have to make tough decisions by themselves. Follow-up care is a key part of your treatment and safety. Be sure to make and go to all appointments, and call your doctor if you are having problems. It's also a good idea to know your test results and keep a list of the medicines you take. How can you care for yourself at home? · Discuss your wishes with your loved ones and your doctor. This way, there are no surprises. · Many states have a unique form. Or you might use a universal form that has been approved by many states. This kind of form can sometimes be completed and stored online. Your electronic copy will then be available wherever you have a connection to the Internet. In most cases, doctors will respect your wishes even if you have a form from a different state. · You don't need a  to do an advance directive. But you may want to get legal advice. · Think about these questions when you prepare an advance directive: ¨ Who do you want to make decisions about your medical care if you are not able to? Many people choose a family member or close friend. ¨ Do you know enough about life support methods that might be used? If not, talk to your doctor so you understand. ¨ What are you most afraid of that might happen? You might be afraid of having pain, losing your independence, or being kept alive by machines. ¨ Where would you prefer to die? Choices include your home, a hospital, or a nursing home. ¨ Would you like to have information about hospice care to support you and your family? ¨ Do you want to donate organs when you die? ¨ Do you want certain Pentecostalism practices performed before you die? If so, put your wishes in the advance directive. · Read your advance directive every year, and make changes as needed. When should you call for help? Be sure to contact your doctor if you have any questions. Where can you learn more? Go to http://yusuf-miesha.info/. Enter R264 in the search box to learn more about \"Advance Directives: Care Instructions. \" Current as of: October 6, 2017 Content Version: 11.7 © 6884-9259 [employer], [email or web address]. Care instructions adapted under license by [employer] (which disclaims liability or warranty for this information). If you have questions about a medical condition or this instruction, always ask your healthcare professional. [address] any warranty or liability for your use of this information. Introducing [facility]! Dear Tati Lorenzo: Thank you for requesting a [employer] account. Our records indicate that you already have an active [employer] account. You can access your account anytime at [email or web address] Did you know that you can access your hospital and ER discharge instructions at any time in [employer]? You can also review all of your test results from your hospital stay or ER visit. Additional Information If you have questions, please visit the Frequently Asked Questions section of the [employer] website at [email or web address] Remember, [employer] is NOT to be used for urgent needs. For medical emergencies, dial 911. Now available from your iPhone and Android! Please provide this summary of care documentation to your next provider. Your primary care clinician is listed as DAVID MENA. If you have any questions after today's visit, please call [phone].

## 2018-08-21 NOTE — PROGRESS NOTES
Subjective:   Mao Caro is a 41 y.o. male presenting for his annual checkup. ROS:  Feeling well. No dyspnea or chest pain on exertion. No abdominal pain, change in bowel habits, black or bloody stools. No urinary tract or prostatic symptoms. No neurological complaints. Specific concerns today: pt's allergies and asthma is well controlled on Singulair. Pt rarely uses Qvar for asthma. Pt uses Nasal steroids and oral antihistamines as needed for allergies but they are much better controlled now that he is taken allergy shots. Pt has had some mild ED that improves with over-the-counter ED medicines, patient wanted to try Viagra. He has used sublingual Viagra 25 mg over-the-counter with good results. Patient Active Problem List   Diagnosis Code    H/O: pneumonia Z87.01    Asthma J45.909    Sinus bradycardia on ECG R00.1    Gastritis K29.70     Current Outpatient Prescriptions   Medication Sig Dispense Refill    sildenafil citrate (VIAGRA) 100 mg tablet Take 1 Tab by mouth daily as needed. 10 Tab 5    multivitamin (ONE A DAY) tablet Take 1 Tab by mouth daily.  OTHER Allergy injections/Dr Jeremias Charlton      montelukast (SINGULAIR) 10 mg tablet   0    mometasone (NASONEX) 50 mcg/actuation nasal spray 2 Sprays by Both Nostrils route daily. 1 Container 11    fexofenadine-pseudoephedrine (ALLEGRA-D)  mg Tb12 Take 1 Tab by mouth every twelve (12) hours.       VENTOLIN HFA 90 mcg/actuation inhaler   4    QVAR 40 mcg/actuation inhaler   10        Lab Results   Component Value Date/Time    WBC 6.8 08/14/2018 07:30 AM    HGB 14.5 08/14/2018 07:30 AM    HCT 42.8 08/14/2018 07:30 AM    PLATELET 966 63/23/7094 07:30 AM    MCV 91 08/14/2018 07:30 AM     Lab Results   Component Value Date/Time    Cholesterol, total 167 08/14/2018 07:30 AM    HDL Cholesterol 55 08/14/2018 07:30 AM    LDL, calculated 94 08/14/2018 07:30 AM    Triglyceride 87 08/14/2018 07:30 AM     Lab Results   Component Value Date/Time    Sodium 143 08/14/2018 07:30 AM    Potassium 4.2 08/14/2018 07:30 AM    Chloride 101 08/14/2018 07:30 AM    CO2 27 08/14/2018 07:30 AM    Anion gap 15.0 08/14/2018 07:30 AM    Glucose 98 08/14/2018 07:30 AM    BUN 11 08/14/2018 07:30 AM    Creatinine 1.0 08/14/2018 07:30 AM    Calcium 9.4 08/14/2018 07:30 AM    Bilirubin, total 0.5 08/14/2018 07:30 AM    ALT (SGPT) 14 08/14/2018 07:30 AM    AST (SGOT) 17 08/14/2018 07:30 AM    Alk. phosphatase 48 08/14/2018 07:30 AM    Protein, total 6.5 08/14/2018 07:30 AM    Albumin 4.2 08/14/2018 07:30 AM    Globulin 2.3 08/14/2018 07:30 AM    A-G Ratio 1.8 08/14/2018 07:30 AM                 Objective:   Visit Vitals    /79 (BP 1 Location: Left arm, BP Patient Position: Sitting)    Pulse (!) 53    Temp 98.2 °F (36.8 °C) (Oral)    Resp 20    Ht 5' 6\" (1.676 m)    Wt 208 lb (94.3 kg)    SpO2 97%    BMI 33.57 kg/m2     The patient appears well, alert, oriented x 3, in no distress. ENT normal.  Neck supple. No adenopathy or thyromegaly. JOHN. Lungs are clear, good air entry, no wheezes, rhonchi or rales. S1 and S2 normal, no murmurs, regular rate and rhythm. Abdomen is soft without tenderness, guarding, mass or organomegaly. Extremities show no edema, normal peripheral pulses. Neurological is normal without focal findings. Assessment/Plan:   healthy adult male  lose weight, increase physical activity, call if any problems. ICD-10-CM ICD-9-CM    1. Physical exam Z00.00 V70.9 LIPID PANEL      METABOLIC PANEL, COMPREHENSIVE      CBC WITH AUTOMATED DIFF   2. Mild intermittent asthma without complication N68.55 352.84  well-controlled on Singulair. 3. Seasonal allergic rhinitis due to pollen J30.1 477.0  much improved with allergy shots       Follow-up Disposition:  Return in about 1 year (around 8/21/2019) for physical, labs 1 week before. Reviewed plan of care. Patient has provided input and agrees with goals.       Discussed the patient's BMI with him. The BMI follow up plan is as follows:     dietary management education, guidance, and counseling  encourage exercise  monitor weight  prescribed dietary intake    An After Visit Summary was printed and given to the patient.

## 2018-08-21 NOTE — PATIENT INSTRUCTIONS
Advance Directives: Care Instructions  Your Care Instructions  An advance directive is a legal way to state your wishes at the end of your life. It tells your family and your doctor what to do if you can no longer say what you want. There are two main types of advance directives. You can change them any time that your wishes change. · A living will tells your family and your doctor your wishes about life support and other treatment. · A durable power of  for health care lets you name a person to make treatment decisions for you when you can't speak for yourself. This person is called a health care agent. If you do not have an advance directive, decisions about your medical care may be made by a doctor or a  who doesn't know you. It may help to think of an advance directive as a gift to the people who care for you. If you have one, they won't have to make tough decisions by themselves. Follow-up care is a key part of your treatment and safety. Be sure to make and go to all appointments, and call your doctor if you are having problems. It's also a good idea to know your test results and keep a list of the medicines you take. How can you care for yourself at home? · Discuss your wishes with your loved ones and your doctor. This way, there are no surprises. · Many states have a unique form. Or you might use a universal form that has been approved by many states. This kind of form can sometimes be completed and stored online. Your electronic copy will then be available wherever you have a connection to the Internet. In most cases, doctors will respect your wishes even if you have a form from a different state. · You don't need a  to do an advance directive. But you may want to get legal advice. · Think about these questions when you prepare an advance directive:  ¨ Who do you want to make decisions about your medical care if you are not able to?  Many people choose a family member or close friend. ¨ Do you know enough about life support methods that might be used? If not, talk to your doctor so you understand. ¨ What are you most afraid of that might happen? You might be afraid of having pain, losing your independence, or being kept alive by machines. ¨ Where would you prefer to die? Choices include your home, a hospital, or a nursing home. ¨ Would you like to have information about hospice care to support you and your family? ¨ Do you want to donate organs when you die? ¨ Do you want certain Latter day practices performed before you die? If so, put your wishes in the advance directive. · Read your advance directive every year, and make changes as needed. When should you call for help? Be sure to contact your doctor if you have any questions. Where can you learn more? Go to http://yusuf-miesha.info/. Enter R264 in the search box to learn more about \"Advance Directives: Care Instructions. \"  Current as of: October 6, 2017  Content Version: 11.7  © 9822-6280 [employer]. Care instructions adapted under license by [employer] (which disclaims liability or warranty for this information). If you have questions about a medical condition or this instruction, always ask your healthcare professional. [address] any warranty or liability for your use of this information. Body Mass Index: Care Instructions  Your Care Instructions    Body mass index (BMI) can help you see if your weight is raising your risk for health problems. It uses a formula to compare how much you weigh with how tall you are. · A BMI lower than 18.5 is considered underweight. · A BMI between 18.5 and 24.9 is considered healthy. · A BMI between 25 and 29.9 is considered overweight. A BMI of 30 or higher is considered obese. If your BMI is in the normal range, it means that you have a lower risk for weight-related health problems.  If your BMI is in the overweight or obese range, you may be at increased risk for weight-related health problems, such as high blood pressure, heart disease, stroke, arthritis or joint pain, and diabetes. If your BMI is in the underweight range, you may be at increased risk for health problems such as fatigue, lower protection (immunity) against illness, muscle loss, bone loss, hair loss, and hormone problems. BMI is just one measure of your risk for weight-related health problems. You may be at higher risk for health problems if you are not active, you eat an unhealthy diet, or you drink too much alcohol or use tobacco products. Follow-up care is a key part of your treatment and safety. Be sure to make and go to all appointments, and call your doctor if you are having problems. It's also a good idea to know your test results and keep a list of the medicines you take. How can you care for yourself at home? · Practice healthy eating habits. This includes eating plenty of fruits, vegetables, whole grains, lean protein, and low-fat dairy. · If your doctor recommends it, get more exercise. Walking is a good choice. Bit by bit, increase the amount you walk every day. Try for at least 30 minutes on most days of the week. · Do not smoke. Smoking can increase your risk for health problems. If you need help quitting, talk to your doctor about stop-smoking programs and medicines. These can increase your chances of quitting for good. · Limit alcohol to 2 drinks a day for men and 1 drink a day for women. Too much alcohol can cause health problems. If you have a BMI higher than 25  · Your doctor may do other tests to check your risk for weight-related health problems. This may include measuring the distance around your waist. A waist measurement of more than 40 inches in men or 35 inches in women can increase the risk of weight-related health problems.   · Talk with your doctor about steps you can take to stay healthy or improve your health. You may need to make lifestyle changes to lose weight and stay healthy, such as changing your diet and getting regular exercise. If you have a BMI lower than 18.5  · Your doctor may do other tests to check your risk for health problems. · Talk with your doctor about steps you can take to stay healthy or improve your health. You may need to make lifestyle changes to gain or maintain weight and stay healthy, such as getting more healthy foods in your diet and doing exercises to build muscle. Where can you learn more? Go to http://yusuf-miesha.info/. Enter S176 in the search box to learn more about \"Body Mass Index: Care Instructions. \"  Current as of: October 13, 2016  Content Version: 11.4  © 7265-2189 [employer]wise, [employer]. Care instructions adapted under license by [employer] (which disclaims liability or warranty for this information). If you have questions about a medical condition or this instruction, always ask your healthcare professional. [place] [address] any warranty or liability for your use of this information.

## 2019-02-12 ENCOUNTER — OFFICE VISIT (OUTPATIENT)
Dept: FAMILY MEDICINE CLINIC | Age: 49
End: 2019-02-12

## 2019-02-12 VITALS
HEIGHT: 66 IN | HEART RATE: 81 BPM | WEIGHT: 210 LBS | RESPIRATION RATE: 20 BRPM | OXYGEN SATURATION: 97 % | SYSTOLIC BLOOD PRESSURE: 118 MMHG | TEMPERATURE: 99.3 F | DIASTOLIC BLOOD PRESSURE: 73 MMHG | BODY MASS INDEX: 33.75 KG/M2

## 2019-02-12 DIAGNOSIS — J40 BRONCHITIS: Primary | ICD-10-CM

## 2019-02-12 DIAGNOSIS — R05.9 COUGH: ICD-10-CM

## 2019-02-12 LAB
QUICKVUE INFLUENZA TEST: NEGATIVE
VALID INTERNAL CONTROL?: YES

## 2019-02-12 RX ORDER — CODEINE PHOSPHATE AND GUAIFENESIN 10; 100 MG/5ML; MG/5ML
10 SOLUTION ORAL
Qty: 120 ML | Refills: 0 | Status: SHIPPED | OUTPATIENT
Start: 2019-02-12 | End: 2019-08-27

## 2019-02-12 RX ORDER — AZITHROMYCIN 250 MG/1
TABLET, FILM COATED ORAL
Qty: 6 TAB | Refills: 0 | Status: SHIPPED | OUTPATIENT
Start: 2019-02-12 | End: 2019-02-17

## 2019-02-12 NOTE — PATIENT INSTRUCTIONS
Influenza (Flu): Care Instructions  Your Care Instructions    Influenza (flu) is an infection in the lungs and breathing passages. It is caused by the influenza virus. There are different strains, or types, of the flu virus from year to year. Unlike the common cold, the flu comes on suddenly and the symptoms, such as a cough, congestion, fever, chills, fatigue, aches, and pains, are more severe. These symptoms may last up to 10 days. Although the flu can make you feel very sick, it usually doesn't cause serious health problems. Home treatment is usually all you need for flu symptoms. But your doctor may prescribe antiviral medicine to prevent other health problems, such as pneumonia, from developing. Older people and those who have a long-term health condition, such as lung disease, are most at risk for having pneumonia or other health problems. Follow-up care is a key part of your treatment and safety. Be sure to make and go to all appointments, and call your doctor if you are having problems. It's also a good idea to know your test results and keep a list of the medicines you take. How can you care for yourself at home? · Get plenty of rest.  · Drink plenty of fluids, enough so that your urine is light yellow or clear like water. If you have kidney, heart, or liver disease and have to limit fluids, talk with your doctor before you increase the amount of fluids you drink. · Take an over-the-counter pain medicine if needed, such as acetaminophen (Tylenol), ibuprofen (Advil, Motrin), or naproxen (Aleve), to relieve fever, headache, and muscle aches. Read and follow all instructions on the label. No one younger than 20 should take aspirin. It has been linked to Reye syndrome, a serious illness. · Do not smoke. Smoking can make the flu worse. If you need help quitting, talk to your doctor about stop-smoking programs and medicines. These can increase your chances of quitting for good.   · Breathe moist air from a hot shower or from a sink filled with hot water to help clear a stuffy nose. · Before you use cough and cold medicines, check the label. These medicines may not be safe for young children or for people with certain health problems. · If the skin around your nose and lips becomes sore, put some petroleum jelly on the area. · To ease coughing:  ? Drink fluids to soothe a scratchy throat. ? Suck on cough drops or plain hard candy. ? Take an over-the-counter cough medicine that contains dextromethorphan to help you get some sleep. Read and follow all instructions on the label. ? Raise your head at night with an extra pillow. This may help you rest if coughing keeps you awake. · Take any prescribed medicine exactly as directed. Call your doctor if you think you are having a problem with your medicine. To avoid spreading the flu  · Wash your hands regularly, and keep your hands away from your face. · Stay home from school, work, and other public places until you are feeling better and your fever has been gone for at least 24 hours. The fever needs to have gone away on its own without the help of medicine. · Ask people living with you to talk to their doctors about preventing the flu. They may get antiviral medicine to keep from getting the flu from you. · To prevent the flu in the future, get a flu vaccine every fall. Encourage people living with you to get the vaccine. · Cover your mouth when you cough or sneeze. When should you call for help? Call 911 anytime you think you may need emergency care.  For example, call if:    · You have severe trouble breathing.    Call your doctor now or seek immediate medical care if:    · You have new or worse trouble breathing.     · You seem to be getting much sicker.     · You feel very sleepy or confused.     · You have a new or higher fever.     · You get a new rash.    Watch closely for changes in your health, and be sure to contact your doctor if:    · You begin to get better and then get worse.     · You are not getting better after 1 week. Where can you learn more? Go to http://yusuf-miesha.info/. Enter P930 in the search box to learn more about \"Influenza (Flu): Care Instructions. \"  Current as of: September 5, 2018  Content Version: 11.9  © 9261-6446 [employer]. Care instructions adapted under license by [employer] (which disclaims liability or warranty for this information). If you have questions about a medical condition or this instruction, always ask your healthcare professional. [address] any warranty or liability for your use of this information.

## 2019-02-12 NOTE — LETTER
NOTIFICATION RETURN TO WORK / SCHOOL 
 
2/12/2019 12:03 PM 
 
Mr. Mariya Carrillo [address] [address] [address] To Whom It May Concern: 
 
Mariya Carrillo is currently under the care of [address] He will return to work/school on: 2/13/19 If there are questions or concerns please have the patient contact our office. Sincerely, Bing Manriquez MD

## 2019-02-12 NOTE — PROGRESS NOTES
HISTORY OF PRESENT ILLNESS  Shazia Randle is a 57 y.o. male. Flu    The history is provided by the patient. This is a new problem. The current episode started yesterday. The problem occurs constantly. The problem has not changed since onset. Associated symptoms include congestion, headaches, muscle aches and cough. Pertinent negatives include no diarrhea and no vomiting. Treatments tried: singuliar, Qvar. The treatment provided mild relief. Review of Systems   Constitutional: Positive for chills and malaise/fatigue. HENT: Positive for congestion. Respiratory: Positive for cough. Gastrointestinal: Negative for diarrhea and vomiting. Neurological: Positive for headaches. Physical Exam   Constitutional: He appears well-developed and well-nourished. HENT:   Right Ear: Tympanic membrane and ear canal normal.   Left Ear: Tympanic membrane and ear canal normal.   Nose: Mucosal edema present. Mouth/Throat: Uvula is midline, oropharynx is clear and moist and mucous membranes are normal.   Cardiovascular: Normal rate, regular rhythm and normal heart sounds. Pulmonary/Chest: Effort normal and breath sounds normal.   Vitals reviewed. ASSESSMENT and PLAN    ICD-10-CM ICD-9-CM    1. Bronchitis J40 490 Will treat with azithromycin (ZITHROMAX) 250 mg tablet      AMB POC RAPID INFLUENZA TEST   2. Cough R05 786.2 Treat with guaiFENesin-codeine (CHERATUSSIN AC) 100-10 mg/5 mL solution     Reviewed plan of care. Patient has provided input and agrees with goals.

## 2019-08-16 LAB
A-G RATIO,AGRAT: 2.1 RATIO (ref 1.1–2.6)
ABSOLUTE LYMPHOCYTE COUNT, 10803: 2 K/UL (ref 1–4.8)
ALBUMIN SERPL-MCNC: 4.6 G/DL (ref 3.5–5)
ALP SERPL-CCNC: 42 U/L (ref 25–115)
ALT SERPL-CCNC: 15 U/L (ref 5–40)
ANION GAP SERPL CALC-SCNC: 14 MMOL/L
AST SERPL W P-5'-P-CCNC: 20 U/L (ref 10–37)
BASOPHILS # BLD: 0 K/UL (ref 0–0.2)
BASOPHILS NFR BLD: 1 % (ref 0–2)
BILIRUB SERPL-MCNC: 0.8 MG/DL (ref 0.2–1.2)
BUN SERPL-MCNC: 17 MG/DL (ref 6–22)
CALCIUM SERPL-MCNC: 9.4 MG/DL (ref 8.4–10.4)
CHLORIDE SERPL-SCNC: 100 MMOL/L (ref 98–110)
CHOLEST SERPL-MCNC: 163 MG/DL (ref 110–200)
CO2 SERPL-SCNC: 25 MMOL/L (ref 20–32)
CREAT SERPL-MCNC: 1 MG/DL (ref 0.5–1.2)
EOSINOPHIL # BLD: 0.1 K/UL (ref 0–0.5)
EOSINOPHIL NFR BLD: 1 % (ref 0–6)
ERYTHROCYTE [DISTWIDTH] IN BLOOD BY AUTOMATED COUNT: 13.2 % (ref 10–15.5)
GFRAA, 66117: >60
GFRNA, 66118: >60
GLOBULIN,GLOB: 2.2 G/DL (ref 2–4)
GLUCOSE SERPL-MCNC: 94 MG/DL (ref 70–99)
GRANULOCYTES,GRANS: 54 % (ref 40–75)
HCT VFR BLD AUTO: 43.1 % (ref 39.3–51.6)
HDLC SERPL-MCNC: 2.8 MG/DL (ref 0–5)
HDLC SERPL-MCNC: 58 MG/DL (ref 40–59)
HGB BLD-MCNC: 14.4 G/DL (ref 13.1–17.2)
LDLC SERPL CALC-MCNC: 87 MG/DL (ref 50–99)
LYMPHOCYTES, LYMLT: 35 % (ref 20–45)
MCH RBC QN AUTO: 30 PG (ref 26–34)
MCHC RBC AUTO-ENTMCNC: 33 G/DL (ref 31–36)
MCV RBC AUTO: 91 FL (ref 80–95)
MONOCYTES # BLD: 0.5 K/UL (ref 0.1–1)
MONOCYTES NFR BLD: 9 % (ref 3–12)
NEUTROPHILS # BLD AUTO: 3 K/UL (ref 1.8–7.7)
PLATELET # BLD AUTO: 246 K/UL (ref 140–440)
PMV BLD AUTO: 10.8 FL (ref 9–13)
POTASSIUM SERPL-SCNC: 4.7 MMOL/L (ref 3.5–5.5)
PROT SERPL-MCNC: 6.8 G/DL (ref 6.4–8.3)
RBC # BLD AUTO: 4.74 M/UL (ref 3.8–5.8)
SODIUM SERPL-SCNC: 139 MMOL/L (ref 133–145)
TRIGL SERPL-MCNC: 87 MG/DL (ref 40–149)
VLDLC SERPL CALC-MCNC: 17 MG/DL (ref 8–30)
WBC # BLD AUTO: 5.5 K/UL (ref 4–11)

## 2019-08-22 DIAGNOSIS — Z00.00 PHYSICAL EXAM: ICD-10-CM

## 2019-08-27 ENCOUNTER — OFFICE VISIT (OUTPATIENT)
Dept: FAMILY MEDICINE CLINIC | Age: 49
End: 2019-08-27

## 2019-08-27 VITALS
WEIGHT: 206.8 LBS | BODY MASS INDEX: 33.23 KG/M2 | DIASTOLIC BLOOD PRESSURE: 69 MMHG | HEART RATE: 68 BPM | TEMPERATURE: 98.8 F | HEIGHT: 66 IN | OXYGEN SATURATION: 98 % | SYSTOLIC BLOOD PRESSURE: 117 MMHG | RESPIRATION RATE: 20 BRPM

## 2019-08-27 DIAGNOSIS — J30.1 SEASONAL ALLERGIC RHINITIS DUE TO POLLEN: ICD-10-CM

## 2019-08-27 DIAGNOSIS — J45.20 MILD INTERMITTENT ASTHMA WITHOUT COMPLICATION: ICD-10-CM

## 2019-08-27 DIAGNOSIS — Z00.00 PHYSICAL EXAM: Primary | ICD-10-CM

## 2019-08-27 DIAGNOSIS — E66.09 CLASS 1 OBESITY DUE TO EXCESS CALORIES WITHOUT SERIOUS COMORBIDITY WITH BODY MASS INDEX (BMI) OF 33.0 TO 33.9 IN ADULT: ICD-10-CM

## 2019-08-27 RX ORDER — SILDENAFIL 100 MG/1
100 TABLET, FILM COATED ORAL
Qty: 10 TAB | Refills: 5 | Status: SHIPPED | OUTPATIENT
Start: 2019-08-27 | End: 2020-09-15 | Stop reason: SDUPTHER

## 2019-08-27 NOTE — PROGRESS NOTES
Patient is in the office today for a physical.      1. Have you been to the ER, urgent care clinic since your last visit? Hospitalized since your last visit? No    2. Have you seen or consulted any other health care providers outside of the [address] [place] [address] since your last visit? Include any pap smears or colon screening.  No

## 2019-08-27 NOTE — PROGRESS NOTES
Subjective:   Saul Laboy is a 57 y.o. male presenting for his annual checkup. ROS:  Feeling well. No dyspnea or chest pain on exertion. No abdominal pain, change in bowel habits, black or bloody stools. No urinary tract or prostatic symptoms. No neurological complaints. Specific concerns today: pt's allergies and asthma is well controlled on Singulair. Pt rarely uses albuterol for asthma. Pt uses Nasal steroids and oral antihistamines as needed for allergies but they are much better controlled now that he is taken allergy shots. Pt has had some mild ED that he uses Viagra for with good results. Patient is working on cutting back calories and increasing exercise to lose weight overall. Patient Active Problem List   Diagnosis Code    H/O: pneumonia Z87.01    Asthma J45.909    Sinus bradycardia on ECG R00.1    Gastritis K29.70     Current Outpatient Medications   Medication Sig Dispense Refill    sildenafil citrate (VIAGRA) 100 mg tablet Take 1 Tab by mouth daily as needed (ED). 10 Tab 5    multivitamin (ONE A DAY) tablet Take 1 Tab by mouth daily.  OTHER Allergy injections/Dr Edmundo Khan      VENTOLIN HFA 90 mcg/actuation inhaler   4    montelukast (SINGULAIR) 10 mg tablet   0    mometasone (NASONEX) 50 mcg/actuation nasal spray 2 Sprays by Both Nostrils route daily. 1 Container 11    fexofenadine-pseudoephedrine (ALLEGRA-D)  mg Tb12 Take 1 Tab by mouth every twelve (12) hours.           Lab Results   Component Value Date/Time    WBC 5.5 08/16/2019 07:20 AM    HGB 14.4 08/16/2019 07:20 AM    HCT 43.1 08/16/2019 07:20 AM    PLATELET 917 79/90/4788 07:20 AM    MCV 91 08/16/2019 07:20 AM     Lab Results   Component Value Date/Time    Cholesterol, total 163 08/16/2019 07:20 AM    HDL Cholesterol 58 08/16/2019 07:20 AM    LDL, calculated 87 08/16/2019 07:20 AM    Triglyceride 87 08/16/2019 07:20 AM     Lab Results   Component Value Date/Time    Sodium 139 08/16/2019 07:20 AM    Potassium 4.7 08/16/2019 07:20 AM    Chloride 100 08/16/2019 07:20 AM    CO2 25 08/16/2019 07:20 AM    Anion gap 14.0 08/16/2019 07:20 AM    Glucose 94 08/16/2019 07:20 AM    BUN 17 08/16/2019 07:20 AM    Creatinine 1.0 08/16/2019 07:20 AM    Calcium 9.4 08/16/2019 07:20 AM    Bilirubin, total 0.8 08/16/2019 07:20 AM    ALT (SGPT) 15 08/16/2019 07:20 AM    AST (SGOT) 20 08/16/2019 07:20 AM    Alk. phosphatase 42 08/16/2019 07:20 AM    Protein, total 6.8 08/16/2019 07:20 AM    Albumin 4.6 08/16/2019 07:20 AM    Globulin 2.2 08/16/2019 07:20 AM    A-G Ratio 2.1 08/16/2019 07:20 AM                 Objective:     Visit Vitals  /69 (BP 1 Location: Left arm, BP Patient Position: Sitting)   Pulse 68   Temp 98.8 °F (37.1 °C) (Oral)   Resp 20   Ht 5' 6\" (1.676 m)   Wt 206 lb 12.8 oz (93.8 kg)   SpO2 98%   BMI 33.38 kg/m²     The patient appears well, alert, oriented x 3, in no distress. ENT normal.  Neck supple. No adenopathy or thyromegaly. JOHN. Lungs are clear, good air entry, no wheezes, rhonchi or rales. S1 and S2 normal, no murmurs, regular rate and rhythm. Abdomen is soft without tenderness, guarding, mass or organomegaly. Extremities show no edema, normal peripheral pulses. Neurological is normal without focal findings. Assessment/Plan:   healthy adult male  lose weight, increase physical activity, call if any problems. ICD-10-CM ICD-9-CM    1. Physical exam Z00.00 V70.9 LIPID PANEL      METABOLIC PANEL, COMPREHENSIVE      CBC WITH AUTOMATED DIFF   2. Seasonal allergic rhinitis due to pollen J30.1 477.0  improved with allergy shots. Patient uses Singulair as needed   3. Mild intermittent asthma without complication H39.14 190.09  improved with allergy shots patient uses albuterol as needed   4.  Class 1 obesity due to excess calories without serious comorbidity with body mass index (BMI) of 33.0 to 33.9 in adult E66.09 278.00  patient will continue to work on diet and exercise to lose weight    Z68.33 V85.33 Follow-up and Dispositions    · Return in about 1 year (around 8/27/2020) for physical, labs 1 week before. Reviewed plan of care. Patient has provided input and agrees with goals. Discussed the patient's BMI with him. The BMI follow up plan is as follows:     dietary management education, guidance, and counseling  encourage exercise  monitor weight  prescribed dietary intake    An After Visit Summary was printed and given to the patient.

## 2019-11-16 ENCOUNTER — OFFICE VISIT (OUTPATIENT)
Dept: FAMILY MEDICINE CLINIC | Age: 49
End: 2019-11-16

## 2019-11-16 VITALS
WEIGHT: 202 LBS | BODY MASS INDEX: 32.47 KG/M2 | DIASTOLIC BLOOD PRESSURE: 72 MMHG | HEIGHT: 66 IN | SYSTOLIC BLOOD PRESSURE: 139 MMHG | RESPIRATION RATE: 16 BRPM | TEMPERATURE: 98 F | OXYGEN SATURATION: 99 % | HEART RATE: 66 BPM

## 2019-11-16 DIAGNOSIS — J01.90 ACUTE SINUSITIS, RECURRENCE NOT SPECIFIED, UNSPECIFIED LOCATION: Primary | ICD-10-CM

## 2019-11-16 RX ORDER — AMOXICILLIN 500 MG/1
500 CAPSULE ORAL 2 TIMES DAILY
Qty: 20 CAP | Refills: 0 | Status: SHIPPED | OUTPATIENT
Start: 2019-11-16 | End: 2019-11-26

## 2019-11-16 NOTE — PATIENT INSTRUCTIONS
Sinusitis: Care Instructions  Your Care Instructions    Sinusitis is an infection of the lining of the sinus cavities in your head. Sinusitis often follows a cold. It causes pain and pressure in your head and face. In most cases, sinusitis gets better on its own in 1 to 2 weeks. But some mild symptoms may last for several weeks. Sometimes antibiotics are needed. Follow-up care is a key part of your treatment and safety. Be sure to make and go to all appointments, and call your doctor if you are having problems. It's also a good idea to know your test results and keep a list of the medicines you take. How can you care for yourself at home? · Take an over-the-counter pain medicine, such as acetaminophen (Tylenol), ibuprofen (Advil, Motrin), or naproxen (Aleve). Read and follow all instructions on the label. · If the doctor prescribed antibiotics, take them as directed. Do not stop taking them just because you feel better. You need to take the full course of antibiotics. · Be careful when taking over-the-counter cold or flu medicines and Tylenol at the same time. Many of these medicines have acetaminophen, which is Tylenol. Read the labels to make sure that you are not taking more than the recommended dose. Too much acetaminophen (Tylenol) can be harmful. · Breathe warm, moist air from a steamy shower, a hot bath, or a sink filled with hot water. Avoid cold, dry air. Using a humidifier in your home may help. Follow the directions for cleaning the machine. · Use saline (saltwater) nasal washes to help keep your nasal passages open and wash out mucus and bacteria. You can buy saline nose drops at a grocery store or drugstore. Or you can make your own at home by adding 1 teaspoon of salt and 1 teaspoon of baking soda to 2 cups of distilled water. If you make your own, fill a bulb syringe with the solution, insert the tip into your nostril, and squeeze gently. [place] your nose.   · Put a hot, wet towel or a warm gel pack on your face 3 or 4 times a day for 5 to 10 minutes each time. · Try a decongestant nasal spray like oxymetazoline (Afrin). Do not use it for more than 3 days in a row. Using it for more than 3 days can make your congestion worse. When should you call for help? Call your doctor now or seek immediate medical care if:    · You have new or worse swelling or redness in your face or around your eyes.     · You have a new or higher fever.    Watch closely for changes in your health, and be sure to contact your doctor if:    · You have new or worse facial pain.     · The mucus from your nose becomes thicker (like pus) or has new blood in it.     · You are not getting better as expected. Where can you learn more? Go to http://yusuf-miesha.info/. Enter D891 in the search box to learn more about \"Sinusitis: Care Instructions. \"  Current as of: October 21, 2018  Content Version: 12.2  © 7972-8143 [employer]. Care instructions adapted under license by [employer] (which disclaims liability or warranty for this information). If you have questions about a medical condition or this instruction, always ask your healthcare professional. [place] [address] any warranty or liability for your use of this information.

## 2019-11-16 NOTE — PROGRESS NOTES
Chief Complaint   Patient presents with    Nasal Congestion     Onset 6 days ago. Associated symptoms: sinus pressure, post nasal drainage headaches, cough productive of green sputum. Treatments tried: Nasonex,Saline Mist,Allegra, Zyrtec       SUBJECTIVE:      Elder Ramachandran is a 54 y.o. male who presents for evaluation of possible sinus infection. Symptoms include congestion, facial pain and Headache and ear pain with no fever, chills, or night sweats. Onset of symptoms was 1 week ago, unchanged since that time. He is drinking plenty of fluids. .  Past history is significant for Sinus infections of which he reports he had a z pack. Patient is a non-smoker. Past Medical History:   Diagnosis Date    Asthma     H/O: pneumonia      Family History   Problem Relation Age of Onset    Hypertension Father     Stroke Father     Heart Failure Father     Diabetes Father     Alzheimer Father      Current Outpatient Medications   Medication Sig Dispense Refill    amoxicillin (AMOXIL) 500 mg capsule Take 1 Cap by mouth two (2) times a day for 10 days. 20 Cap 0    multivitamin (ONE A DAY) tablet Take 1 Tab by mouth daily.  mometasone (NASONEX) 50 mcg/actuation nasal spray 2 Sprays by Both Nostrils route daily. 1 Container 11    fexofenadine-pseudoephedrine (ALLEGRA-D)  mg Tb12 Take 1 Tab by mouth every twelve (12) hours.  OTHER Allergy injections/Dr Barbara Elizabeth      montelukast (SINGULAIR) 10 mg tablet   0    sildenafil citrate (VIAGRA) 100 mg tablet Take 1 Tab by mouth daily as needed (ED).  10 Tab 5    VENTOLIN HFA 90 mcg/actuation inhaler   4     Allergies   Allergen Reactions    Keflex [Cephalexin] Rash    Levaquin [Levofloxacin] Diarrhea and Other (comments)     Bloody stools       Social History     Socioeconomic History    Marital status:      Spouse name: Not on file    Number of children: Not on file    Years of education: Not on file    Highest education level: Not on file Occupational History    Not on file   Social Needs    Financial resource strain: Not on file    Food insecurity:     Worry: Not on file     Inability: Not on file    Transportation needs:     Medical: Not on file     Non-medical: Not on file   Tobacco Use    Smoking status: Never Smoker    Smokeless tobacco: Never Used   Substance and Sexual Activity    Alcohol use: No    Drug use: No    Sexual activity: Not on file   Lifestyle    Physical activity:     Days per week: Not on file     Minutes per session: Not on file    Stress: Not on file   Relationships    Social connections:     Talks on phone: Not on file     Gets together: Not on file     Attends Anabaptist service: Not on file     Active member of club or organization: Not on file     Attends meetings of clubs or organizations: Not on file     Relationship status: Not on file    Intimate partner violence:     Fear of current or ex partner: Not on file     Emotionally abused: Not on file     Physically abused: Not on file     Forced sexual activity: Not on file   Other Topics Concern    Not on file   Social History Narrative    Not on file     Review of Systems  Pertinent items are noted in HPI. Objective:     Visit Vitals  /72 (BP 1 Location: Right arm, BP Patient Position: Sitting)   Pulse 66   Temp 98 °F (36.7 °C) (Oral)   Resp 16   Ht 5' 6\" (1.676 m)   Wt 202 lb (91.6 kg)   SpO2 99%   BMI 32.60 kg/m²     General:  alert, cooperative, no distress, appears stated age   Head:  frontal sinus tenderness bilateral, maxillary sinus tenderness bilateral   Eyes: conjunctivae/corneas clear. PERRL, EOM's intact.  Fundi benign   Ears: abnormal TM AD - erythematous, abnormal TM AS - normal landmarks and mobility   Sinus tender: positive   Mouth:  Lips, mucosa, and tongue normal. Teeth and gums normal   Neck: supple, symmetrical, trachea midline, no adenopathy, thyroid: not enlarged, symmetric, no tenderness/mass/nodules, no carotid bruit and no JVD.   Lungs: clear to auscultation bilaterally        Assessment:     Acute bacterial sinusitis    Plan:     1. OTC Allergy   2. Amoxicillin   3. Nasal saline rinses as needed for congestion. 4. Follow-up with PCP in 7 days or return if symptoms worsen or persist.    Orders Placed This Encounter    amoxicillin (AMOXIL) 500 mg capsule     Sig: Take 1 Cap by mouth two (2) times a day for 10 days. Dispense:  20 Cap     Refill:  0     I have discussed the diagnosis with the patient and the intended plan as seen in the above orders. The patient has received an after-visit summary and questions were answered concerning future plans. I have discussed medication side effects and warnings with the patient as well. Patient agreeable with above plan and verbalizes understanding.

## 2020-03-06 ENCOUNTER — OFFICE VISIT (OUTPATIENT)
Dept: FAMILY MEDICINE CLINIC | Age: 50
End: 2020-03-06

## 2020-03-06 VITALS
DIASTOLIC BLOOD PRESSURE: 64 MMHG | WEIGHT: 193 LBS | BODY MASS INDEX: 31.02 KG/M2 | SYSTOLIC BLOOD PRESSURE: 123 MMHG | RESPIRATION RATE: 20 BRPM | TEMPERATURE: 98.5 F | HEIGHT: 66 IN | HEART RATE: 58 BPM | OXYGEN SATURATION: 98 %

## 2020-03-06 DIAGNOSIS — J40 BRONCHITIS: ICD-10-CM

## 2020-03-06 DIAGNOSIS — R05.9 COUGH: ICD-10-CM

## 2020-03-06 DIAGNOSIS — J45.21 MILD INTERMITTENT ASTHMA WITH ACUTE EXACERBATION: Primary | ICD-10-CM

## 2020-03-06 RX ORDER — CODEINE PHOSPHATE AND GUAIFENESIN 10; 100 MG/5ML; MG/5ML
10 SOLUTION ORAL
Qty: 120 ML | Refills: 0 | Status: SHIPPED | OUTPATIENT
Start: 2020-03-06 | End: 2020-11-06 | Stop reason: SDUPTHER

## 2020-03-06 RX ORDER — FLUTICASONE PROPIONATE 220 UG/1
2 AEROSOL, METERED RESPIRATORY (INHALATION) 2 TIMES DAILY
Qty: 1 INHALER | Refills: 1 | COMMUNITY
Start: 2020-03-06 | End: 2022-10-10 | Stop reason: SDUPTHER

## 2020-03-06 RX ORDER — PREDNISONE 10 MG/1
TABLET ORAL
Qty: 21 TAB | Refills: 0 | Status: SHIPPED | OUTPATIENT
Start: 2020-03-06 | End: 2020-04-13 | Stop reason: SDUPTHER

## 2020-03-06 RX ORDER — AZITHROMYCIN 250 MG/1
TABLET, FILM COATED ORAL
Qty: 6 TAB | Refills: 0 | Status: SHIPPED | OUTPATIENT
Start: 2020-03-06 | End: 2020-11-06 | Stop reason: SDUPTHER

## 2020-03-06 NOTE — PROGRESS NOTES
Patient is in the office today for an Asthma Attack x 2 nights. Patient states he has a productive cough with grey sputum and grey nasal drainage. SOB with exertion, was better today. Patient states he is mentally slower. 1. Have you been to the ER, urgent care clinic since your last visit? Hospitalized since your last visit? No    2. Have you seen or consulted any other health care providers outside of the [address] [place] [address] since your last visit? Include any pap smears or colon screening. yes, Dr. Jovana Azevedo allergy.

## 2020-03-06 NOTE — PATIENT INSTRUCTIONS
Learning About Asthma Triggers  What are triggers? When you have asthma, certain things can make your symptoms worse. These are called triggers. They include:  · Cigarette smoke or air pollution. · Things you are allergic to, such as:  ¨ Pollen, mold, or dust mites. ¨ Pet hair, skin, or saliva. · Illnesses, like colds, flu, or pneumonia. · Exercise. · Dry, cold air. How do triggers affect asthma? Triggers can make it harder for your lungs to work as they should and can lead to sudden difficulty breathing and other symptoms. When you are around a trigger, an asthma attack is more likely. If your symptoms are severe, you may need emergency treatment or have to go to the hospital for treatment. If you know what your triggers are and can avoid them, you may be able to prevent asthma attacks, reduce how often you have them, and make them less severe. What can you do to avoid triggers? The first thing is to know your triggers. When you are having symptoms, note the things around you that might be causing them. Then look for patterns in what may be triggering your symptoms. Record your triggers on a piece of paper or in an asthma diary. When you have your list of possible triggers, work with your doctor to find ways to avoid them. You also can check how well your lungs are working by measuring your peak expiratory flow (PEF) throughout the day. Your PEF may drop when you are near things that trigger symptoms. Here are some ways to avoid a few common triggers. · Do not smoke or allow others to smoke around you. If you need help quitting, talk to your doctor about stop-smoking programs and medicines. These can increase your chances of quitting for good. · If there is a lot of pollution, pollen, or dust outside, stay at home and keep your windows closed. Use an air conditioner or air filter in your home. Check your local weather report or newspaper for air quality and pollen reports.   · Get a flu shot every year. Talk to your doctor about getting a pneumococcal shot. Wash your hands often to prevent infections. · Avoid exercising outdoors in cold weather. If you are outdoors in cold weather, wear a scarf around your face and breathe through your nose. How can you manage an asthma attack? · If you have an asthma action plan, follow the plan. In general:  ¨ Use your quick-relief inhaler as directed by your doctor. If your symptoms do not get better after you use your medicine, have someone take you to the emergency room. Call an ambulance if needed. ¨ If your doctor has given you other inhaled medicines or steroid pills, take them as directed. Where can you learn more? Go to [employer]Par[employer].be  Enter M564 in the search box to learn more about \"Learning About Asthma Triggers. \"   © 0824-8733 [employer]. Care instructions adapted under license by [facility] [employer] (which disclaims liability or warranty for this information). This care instruction is for use with your licensed healthcare professional. If you have questions about a medical condition or this instruction, always ask your healthcare professional. [place] [address] any warranty or liability for your use of this information. Content Version: 00.2.911217;  Last Revised: February 23, 2012

## 2020-03-07 NOTE — PROGRESS NOTES
HISTORY OF PRESENT ILLNESS  Huma Marie is a 54 y.o. male. Cold Symptoms   The history is provided by the patient. This is a new problem. The current episode started more than 1 week ago. The problem occurs constantly. The problem has been gradually worsening. The cough is productive of brown sputum. There has been no fever. Associated symptoms include rhinorrhea, shortness of breath and wheezing. Pertinent negatives include no chills. Treatments tried: Nasonex, Flonase, Qvar  The treatment provided mild relief. He is not a smoker. His past medical history is significant for asthma. Review of Systems   Constitutional: Negative for chills. HENT: Positive for rhinorrhea. Respiratory: Positive for sputum production, shortness of breath and wheezing. Physical Exam  Vitals signs and nursing note reviewed. Constitutional:       Appearance: Normal appearance. HENT:      Right Ear: Tympanic membrane and ear canal normal.      Left Ear: Tympanic membrane and ear canal normal.      Nose: Mucosal edema and congestion present. Mouth/Throat:      Pharynx: Oropharynx is clear. Cardiovascular:      Rate and Rhythm: Normal rate and regular rhythm. Pulmonary:      Breath sounds: Wheezing present. Neurological:      Mental Status: He is alert. ASSESSMENT and PLAN    ICD-10-CM ICD-9-CM    1. Mild intermittent asthma with acute exacerbation J45.21 493.92 predniSONE (STERAPRED DS) 10 mg dose pack   2. Bronchitis J40 490 azithromycin (ZITHROMAX) 250 mg tablet   3. Cough R05 786.2 guaiFENesin-codeine (CHERATUSSIN AC) 100-10 mg/5 mL solution       Reviewed plan of care. Patient has provided input and agrees with goals.

## 2020-04-13 ENCOUNTER — E-VISIT (OUTPATIENT)
Dept: FAMILY MEDICINE CLINIC | Age: 50
End: 2020-04-13

## 2020-04-13 DIAGNOSIS — L23.9 ALLERGIC DERMATITIS: Primary | ICD-10-CM

## 2020-04-13 RX ORDER — PREDNISONE 10 MG/1
TABLET ORAL
Qty: 21 TAB | Refills: 0 | Status: SHIPPED | OUTPATIENT
Start: 2020-04-13 | End: 2020-04-29 | Stop reason: SDUPTHER

## 2020-04-29 ENCOUNTER — E-VISIT (OUTPATIENT)
Dept: FAMILY MEDICINE CLINIC | Age: 50
End: 2020-04-29

## 2020-04-29 DIAGNOSIS — L23.9 ALLERGIC DERMATITIS: Primary | ICD-10-CM

## 2020-04-29 RX ORDER — PREDNISONE 10 MG/1
TABLET ORAL
Qty: 21 TAB | Refills: 0 | Status: SHIPPED | OUTPATIENT
Start: 2020-04-29 | End: 2020-09-15

## 2020-04-29 NOTE — TELEPHONE ENCOUNTER
Pt will be started back on Prednisone for rash.   Refer to dermatology or allergist if it continues to recur

## 2020-05-28 ENCOUNTER — VIRTUAL VISIT (OUTPATIENT)
Dept: INTERNAL MEDICINE CLINIC | Age: 50
End: 2020-05-28

## 2020-05-28 DIAGNOSIS — R10.30 INGUINAL PAIN, UNSPECIFIED LATERALITY: Primary | ICD-10-CM

## 2020-05-28 NOTE — PROGRESS NOTES
Brynn Peña 54 y.o. male who was seen by synchronous (real-time) audio-video technology on 05/28/20    Consent:  heand/or his healthcare decision maker is aware that this patient-initiated Telehealth encounter is a billable service, with coverage as determined by her insurance carrier. he is aware that he may receive a bill and has provided verbal consent to proceed: Yes I was in my office while conducting this encounter. The patient was in their home. Mavis Thao is a 54 y.o.  male and presents with Groin Pain      SUBJECTIVE:    Pt has noticed some groin pain as he continues to run 3x/week and do some weight training on the other days. It was worst a week ago and has improved over the last day. He denies any bulging in the groin area and has no h/o hernias. Respiratory ROS: negative for - shortness of breath  Cardiovascular ROS: negative for - chest pain    Current Outpatient Medications   Medication Sig    predniSONE (STERAPRED DS) 10 mg dose pack See administration instruction per 10mg dose pack    fluticasone propionate (FLOVENT HFA) 220 mcg/actuation inhaler Take 2 Puffs by inhalation two (2) times a day.  sildenafil citrate (VIAGRA) 100 mg tablet Take 1 Tab by mouth daily as needed (ED).  multivitamin (ONE A DAY) tablet Take 1 Tab by mouth daily.  mometasone (NASONEX) 50 mcg/actuation nasal spray 2 Sprays by Both Nostrils route daily.  fexofenadine-pseudoephedrine (ALLEGRA-D)  mg Tb12 Take 1 Tab by mouth every twelve (12) hours.  OTHER Allergy injections/Dr Dinora Jones VENTOLIN HFA 90 mcg/actuation inhaler     montelukast (SINGULAIR) 10 mg tablet      No current facility-administered medications for this visit. OBJECTIVE:  alert, well appearing, and in no distress  There were no vitals taken for this visit. well developed and well nourished              Assessment/Plan      ICD-10-CM ICD-9-CM    1.  Inguinal pain, unspecified laterality R10.30 789.09 Groin muscle strain vs possible hernia. If pain continues to resolve with rest pt can slowly restart exercising. If pain returns or does not resolve he will need to be seen in the office. Reviewed plan of care. Patient has provided input and agrees with goals.

## 2020-06-15 ENCOUNTER — TELEPHONE (OUTPATIENT)
Dept: INTERNAL MEDICINE CLINIC | Age: 50
End: 2020-06-15

## 2020-06-16 ENCOUNTER — CLINICAL SUPPORT (OUTPATIENT)
Dept: INTERNAL MEDICINE CLINIC | Age: 50
End: 2020-06-16

## 2020-06-16 VITALS — TEMPERATURE: 97.9 F

## 2020-06-16 DIAGNOSIS — Z11.1 SCREENING-PULMONARY TB: Primary | ICD-10-CM

## 2020-06-16 NOTE — PROGRESS NOTES
PPD Placement note  Theo Pedroza, 54 y.o. male is here today for placement of PPD test  Reason for PPD test: WORK  Pt taken PPD test before: yes  Verified in allergy area and with patient that they are not allergic to the products PPD is made of (Phenol or Tween). Yes  Is patient taking any oral or IV steroid medication now or have they taken it in the last month? no  Has the patient ever received the BCG vaccine?: no  Has the patient been in recent contact with anyone known or suspected of having active TB disease?: no      O: Alert and oriented in NAD. P:  PPD placed on 6/16/2020 IN LEFT FOREARM. Patient advised to return for reading within 48-72 hours.

## 2020-06-16 NOTE — PATIENT INSTRUCTIONS
Tuberculin Skin Test: Care Instructions  What is it? The tuberculosis (TB) skin test can tell if you have TB bacteria in your body. Tuberculosis (TB) is a bacterial infection that can damage the lungs or other parts of the body. Many people are exposed to TB and test positive for TB bacteria in their bodies, but they don't get the disease. TB bacteria can stay in your body without making you sick. This is because your immune system can keep TB in check. Why is the test done? Your doctor may want you to have this test if you have been in close contact with someone who has tuberculosis (TB). You may also have the test if you have symptoms that might be causing TB. These include a cough that doesn't go away and unexplained weight loss. How do you prepare for the test?  In general, there's nothing you have to do before this test, unless your doctor tells you to. How is the test done? For a tuberculin skin test, you sit down and turn the inner side of your forearm up. The skin where the test is done is cleaned and allowed to dry. A small shot of the tuberculosis antigen (purified protein derivative, or PPD) is put under the top layer of skin. The fluid makes a little bump (wheal) under the skin. A [place] may be drawn around the test area with a pen. What happens after the test?  · Do not cover the site with a bandage. · You must see your doctor 2 to 3 days after the test to have the skin test checked. If you have TB in your body, a firm red bump will form at the shot site within 2 days. · If the test shows that you are infected with TB (positive), your doctor probably will order more tests. A TB-positive skin test can't tell when you became infected with TB. And it can't tell whether the infection can be passed to others. How can you care for yourself at home? · Do not scratch the test site. Scratching it may cause redness or swelling. This could affect the test results.   · To ease itching, put a cold washcloth on the site. Then pat the site dry. · Do not cover the test site with a bandage or other dressing. Follow-up care is a key part of your treatment and safety. Be sure to make and go to all appointments, and call your doctor if you are having problems. Ask your doctor when you can expect to have your test results. Where can you learn more? Go to http://yusuf-miesha.info/  Enter J474 in the search box to learn more about \"Tuberculin Skin Test: Care Instructions. \"  Current as of: February 11, 2020               Content Version: 12.5  © 3412-5008 Healthwise, Incorporated. Care instructions adapted under license by [employer] (which disclaims liability or warranty for this information). If you have questions about a medical condition or this instruction, always ask your healthcare professional. [address] any warranty or liability for your use of this information.

## 2020-06-19 ENCOUNTER — CLINICAL SUPPORT (OUTPATIENT)
Dept: INTERNAL MEDICINE CLINIC | Age: 50
End: 2020-06-19

## 2020-06-19 DIAGNOSIS — Z00.00 PHYSICAL EXAM: ICD-10-CM

## 2020-06-19 LAB
MM INDURATION POC: 0 MM (ref 0–5)
PPD POC: NEGATIVE NEGATIVE

## 2020-06-19 NOTE — PROGRESS NOTES
PPD Reading Note  [facility] and [facility]   Result: 0 mm induration in left forearm  Interpretation: negative  Allergic reaction: no

## 2020-06-26 DIAGNOSIS — Z00.00 PHYSICAL EXAM: Primary | ICD-10-CM

## 2020-06-30 ENCOUNTER — OFFICE VISIT (OUTPATIENT)
Dept: INTERNAL MEDICINE CLINIC | Age: 50
End: 2020-06-30

## 2020-06-30 VITALS
RESPIRATION RATE: 18 BRPM | DIASTOLIC BLOOD PRESSURE: 70 MMHG | OXYGEN SATURATION: 98 % | WEIGHT: 190 LBS | HEIGHT: 66 IN | SYSTOLIC BLOOD PRESSURE: 129 MMHG | TEMPERATURE: 98.5 F | BODY MASS INDEX: 30.53 KG/M2 | HEART RATE: 51 BPM

## 2020-06-30 DIAGNOSIS — K40.20 NON-RECURRENT BILATERAL INGUINAL HERNIA WITHOUT OBSTRUCTION OR GANGRENE: ICD-10-CM

## 2020-06-30 DIAGNOSIS — R10.30 INGUINAL PAIN, UNSPECIFIED LATERALITY: Primary | ICD-10-CM

## 2020-06-30 NOTE — PROGRESS NOTES
Patient is in the office today for a possible hernia or pulled muscle  in the groin. 1. Have you been to the ER, urgent care clinic since your last visit? Hospitalized since your last visit? No    2. Have you seen or consulted any other health care providers outside of the [address] [place] [address] since your last visit? Include any pap smears or colon screening.  No

## 2020-06-30 NOTE — PROGRESS NOTES
Annie Finley is a 54 y.o.  male and presents with Hernia (Non Specific) (groin area)      SUBJECTIVE:    Abdominal Pain  Patient complains of abdominal pain. The pain is described as intermittent, and is 5/10 in intensity. Pain is located in the bilateral groin area with radiation to R groin and L groin. Onset was 7 weeks ago. Symptoms have been unchanged since. Aggravating factors: movement, pressure, sitting up and lifting. Alleviating factors: recumbency. Associated symptoms: none. The patient denies anorexia and chills. Respiratory ROS: negative for - shortness of breath  Cardiovascular ROS: negative for - chest pain    Current Outpatient Medications   Medication Sig    predniSONE (STERAPRED DS) 10 mg dose pack See administration instruction per 10mg dose pack    fluticasone propionate (FLOVENT HFA) 220 mcg/actuation inhaler Take 2 Puffs by inhalation two (2) times a day.  sildenafil citrate (VIAGRA) 100 mg tablet Take 1 Tab by mouth daily as needed (ED).  multivitamin (ONE A DAY) tablet Take 1 Tab by mouth daily.  mometasone (NASONEX) 50 mcg/actuation nasal spray 2 Sprays by Both Nostrils route daily.  fexofenadine-pseudoephedrine (ALLEGRA-D)  mg Tb12 Take 1 Tab by mouth every twelve (12) hours.  OTHER Allergy injections/Dr Alonso Daily VENTOLIN HFA 90 mcg/actuation inhaler     montelukast (SINGULAIR) 10 mg tablet      No current facility-administered medications for this visit.           OBJECTIVE:  alert, well appearing, and in no distress  Visit Vitals  /70 (BP 1 Location: Left arm, BP Patient Position: Sitting)   Pulse (!) 51   Temp 98.5 °F (36.9 °C) (Oral)   Resp 18   Ht 5' 6\" (1.676 m)   Wt 190 lb (86.2 kg)   SpO2 98%   BMI 30.67 kg/m²      well developed and well nourished  Abdomen - soft, nontender, nondistended, no masses or organomegaly   Male - HERNIA EXAM: tender, PENIS: normal without lesions or discharge, circumcised, SCROTUM: normal, no masses          Assessment/Plan      ICD-10-CM ICD-9-CM    1. Inguinal pain, unspecified laterality R10.30 789.09 CT ABD PELV W CONT   2. Non-recurrent bilateral inguinal hernia without obstruction or gangrene K40.20 550.92 REFERRAL TO SURGERY     Follow-up and Dispositions    · Return if symptoms worsen or fail to improve. Reviewed plan of care. Patient has provided input and agrees with goals.

## 2020-06-30 NOTE — PATIENT INSTRUCTIONS
A Healthy Lifestyle: Care Instructions  Your Care Instructions     A healthy lifestyle can help you feel good, stay at a healthy weight, and have plenty of energy for both work and play. A healthy lifestyle is something you can share with your whole family. A healthy lifestyle also can lower your risk for serious health problems, such as high blood pressure, heart disease, and diabetes. You can follow a few steps listed below to improve your health and the health of your family. Follow-up care is a key part of your treatment and safety. Be sure to make and go to all appointments, and call your doctor if you are having problems. It's also a good idea to know your test results and keep a list of the medicines you take. How can you care for yourself at home? · Do not eat too much sugar, fat, or fast foods. You can still have dessert and treats now and then. The goal is moderation. · Start small to improve your eating habits. Pay attention to portion sizes, drink less juice and soda pop, and eat more fruits and vegetables. ? Eat a healthy amount of food. A 3-ounce serving of meat, for example, is about the size of a deck of cards. Fill the rest of your plate with vegetables and whole grains. ? Limit the amount of soda and sports drinks you have every day. Drink more water when you are thirsty. ? Eat at least 5 servings of fruits and vegetables every day. It may seem like a lot, but it is not hard to reach this goal. A serving or helping is 1 piece of fruit, 1 cup of vegetables, or 2 cups of leafy, raw vegetables. Have an apple or some carrot sticks as an afternoon snack instead of a candy bar. Try to have fruits and/or vegetables at every meal.  · Make exercise part of your daily routine. You may want to start with simple activities, such as walking, bicycling, or slow swimming. Try to be active 30 to 60 minutes every day. You do not need to do all 30 to 60 minutes all at once.  For example, you can exercise 3 times a day for 10 or 20 minutes. Moderate exercise is safe for most people, but it is always a good idea to talk to your doctor before starting an exercise program.  · Keep moving. Dominic Fickle the lawn, work in the garden, or [employer]ge. Take the stairs instead of the elevator at work. · If you smoke, quit. People who smoke have an increased risk for heart attack, stroke, cancer, and other lung illnesses. Quitting is hard, but there are ways to boost your chance of quitting tobacco for good. ? Use nicotine gum, patches, or lozenges. ? Ask your doctor about stop-smoking programs and medicines. ? Keep trying. In addition to reducing your risk of diseases in the future, you will notice some benefits soon after you stop using tobacco. If you have shortness of breath or asthma symptoms, they will likely get better within a few weeks after you quit. · Limit how much alcohol you drink. Moderate amounts of alcohol (up to 2 drinks a day for men, 1 drink a day for women) are okay. But drinking too much can lead to liver problems, high blood pressure, and other health problems. Family health  If you have a family, there are many things you can do together to improve your health. · Eat meals together as a family as often as possible. · Eat healthy foods. This includes fruits, vegetables, lean meats and dairy, and whole grains. · Include your family in your fitness plan. Most people think of activities such as jogging or tennis as the way to fitness, but there are many ways you and your family can be more active. Anything that makes you breathe hard and gets your heart pumping is exercise. Here are some tips:  ? Walk to do errands or to take your child to school or the bus.  ? Go for a family bike ride after dinner instead of watching TV. Where can you learn more?   Go to http://yusuf-miesha.info/  Enter H059 in the search box to learn more about \"A Healthy Lifestyle: Care Instructions. \"  Current as of: January 31, 2020               Content Version: 12.5  © 6230-0274 [employer]. Care instructions adapted under license by [employer] (which disclaims liability or warranty for this information). If you have questions about a medical condition or this instruction, always ask your healthcare professional. [place] [address] any warranty or liability for your use of this information.

## 2020-07-07 ENCOUNTER — HOSPITAL ENCOUNTER (OUTPATIENT)
Dept: CT IMAGING | Age: 50
Discharge: HOME | End: 2020-07-07
Attending: INTERNAL MEDICINE
Payer: COMMERCIAL

## 2020-07-07 DIAGNOSIS — R10.30 INGUINAL PAIN, UNSPECIFIED LATERALITY: ICD-10-CM

## 2020-07-07 PROCEDURE — 74011636320 HC RX REV CODE- 636/320: Performed by: INTERNAL MEDICINE

## 2020-07-07 PROCEDURE — 74177 CT ABD & PELVIS W/CONTRAST: CPT

## 2020-07-07 RX ADMIN — IOPAMIDOL 100 ML: 612 INJECTION, SOLUTION INTRAVENOUS at 08:17

## 2020-07-21 ENCOUNTER — OFFICE VISIT (OUTPATIENT)
Dept: SURGERY | Age: 50
End: 2020-07-21

## 2020-07-21 VITALS
SYSTOLIC BLOOD PRESSURE: 128 MMHG | HEART RATE: 62 BPM | OXYGEN SATURATION: 98 % | WEIGHT: 193 LBS | HEIGHT: 66 IN | RESPIRATION RATE: 18 BRPM | BODY MASS INDEX: 31.02 KG/M2 | DIASTOLIC BLOOD PRESSURE: 70 MMHG | TEMPERATURE: 97.4 F

## 2020-07-21 DIAGNOSIS — R10.32 BILATERAL GROIN PAIN: Primary | ICD-10-CM

## 2020-07-21 DIAGNOSIS — R10.31 BILATERAL GROIN PAIN: Primary | ICD-10-CM

## 2020-07-21 NOTE — PROGRESS NOTES
Matthias Birmingham is a 54 y.o. male  Chief Complaint   Patient presents with    Possible Hernia     patient c/o bilaterial inguinal pain. Review of Systems   Constitutional: Negative. HENT: Negative. Eyes: Negative. Respiratory: Negative. Cardiovascular: Negative. Gastrointestinal: Negative. Genitourinary: Negative. Musculoskeletal: Negative. Skin: Negative. Neurological: Negative. Endo/Heme/Allergies: Positive for environmental allergies. Psychiatric/Behavioral: Negative.

## 2020-07-21 NOTE — PROGRESS NOTES
[facility]  General Surgery    Subjective:      HPI: Patient is a very pleasant 82-year-old male with a past medical history remarkable for obesity with a BMI 31.15 kg per metered square, asthma, environmental allergies and history of pneumonia. He is referred by Dr. Emily Sanchez for evaluation and management of bilateral inguinal pain. I reviewed the patient's CT abdomen pelvis independently on the monitor. No evidence of inguinal hernia. 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 He started to develop bilateral groin pain. He saw Dr. Adeola Hunter who suggested that he stop running outside on the concrete or asphalt. He then went back to running on the treadmill. He has been using ice and rest and stretching for the pain. He states that the pain is improved. He is now running 4 miles in 30 minutes. He denies any bulge in the groin. Patient Active Problem List    Diagnosis Date Noted    Sinus bradycardia on ECG 06/03/2016    Gastritis 06/03/2016    Asthma 09/29/2015    H/O: pneumonia      Past Medical History:   Diagnosis Date    Asthma     Environmental allergies     H/O: pneumonia       Past Surgical History:   Procedure Laterality Date    ABDOMEN SURGERY PROC UNLISTED      HX CHOLECYSTECTOMY  2005      Family History   Problem Relation Age of Onset    Hypertension Father     Stroke Father     Heart Failure Father     Diabetes Father     Alzheimer Father       Social History     Tobacco Use    Smoking status: Never Smoker    Smokeless tobacco: Never Used   Substance Use Topics    Alcohol use: No      Allergies   Allergen Reactions    Keflex [Cephalexin] Rash    Levaquin [Levofloxacin] Diarrhea and Other (comments)     Bloody stools         Prior to Admission medications    Medication Sig Start Date End Date Taking?  Authorizing Provider   predniSONE (STERAPRED DS) 10 mg dose pack See administration instruction per 10mg dose pack 4/29/20  Yes Nitesh Saldaña MD   fluticasone propionate (FLOVENT HFA) 220 mcg/actuation inhaler Take 2 Puffs by inhalation two (2) times a day. 3/6/20  Yes Al Tello MD   sildenafil citrate (VIAGRA) 100 mg tablet Take 1 Tab by mouth daily as needed (ED). 8/27/19  Yes Nitesh Saldaña MD   multivitamin (ONE A DAY) tablet Take 1 Tab by mouth daily. Yes Provider, Historical   mometasone (NASONEX) 50 mcg/actuation nasal spray 2 Sprays by Both Nostrils route daily. 2/28/18  Yes Danyelle Trujillo NP   fexofenadine-pseudoephedrine (ALLEGRA-D)  mg Tb12 Take 1 Tab by mouth every twelve (12) hours. Yes Provider, Historical   OTHER Allergy injections/Dr Cydney Nixon Provider, Historical   VENTOLIN HFA 90 mcg/actuation inhaler  7/2/15  Yes Provider, Historical   montelukast (SINGULAIR) 10 mg tablet  7/2/15  Yes Provider, Historical       Review of Systems:    14 systems were reviewed. The results are as above in the HPI and otherwise negative. Objective:     Vitals:    07/21/20 1343   BP: 128/70   Pulse: 62   Resp: 18   Temp: 97.4 °F (36.3 °C)   TempSrc: Oral   SpO2: 98%   Weight: 87.5 kg (193 lb)   Height: 5' 6\" (1.676 m)       Physical Exam:  GENERAL: alert, cooperative, no distress, appears stated age,   EYE: conjunctivae/corneas clear. PERRL, EOM's intact. THROAT & NECK: normal and no erythema or exudates noted. ,    LYMPHATIC: Cervical, supraclavicular, and axillary nodes normal. ,   LUNG: clear to auscultation bilaterally,   HEART: regular rate and rhythm, S1, S2 normal, no murmur, click, rub or gallop,   ABDOMEN: soft, non-tender. Bowel sounds normal. No masses,  no organomegaly. No evidence of hernia with Valsalva or cough. EXTREMITIES:  extremities normal, atraumatic, no cyanosis or edema,   SKIN: Normal.,   NEUROLOGIC: AOx3. Cranial nerves 2-12 and sensation grossly intact. ,     Data Review: CT reviewed independently on the monitor as stated in history of present illness    Mr. Doc Melendez has a reminder for a \"due or due soon\" health maintenance. I have asked that he contact his primary care provider for follow-up on this health maintenance. Impression:     · Patient with bilateral inguinal  and inner thigh discomfort and pain most likely secondary to intensity of working out outside of the asphalt. Most likely muscle strain pool. No evidence of hernia.     Plan:     · 2-3 times per week hot bath  · Stretching particularly sun salutation's daily  · Use ice and heat alternating on the groin and thighs to help reduce pain and discomfort  · Ibuprofen 400 mg 800 mg p.o. 3 times daily for persistent pain  · Follow-up PRN    Signed By: Isidro Bond MD     July 21, 2020

## 2020-07-21 NOTE — PATIENT INSTRUCTIONS
Inguinal Hernia: Care Instructions  Your Care Instructions     An inguinal hernia occurs when tissue bulges through a weak spot in your groin area. You may see or feel a tender bulge in the groin or scrotum. You may also have pain, pressure or burning, or a feeling that something has \"given way. \"  Hernias are caused by a weakness in the belly wall. The bulge or discomfort may occur after heavy lifting, straining, or coughing. Hernias do not heal on their own, and they tend to get worse over time. If your hernia does not bother you, you most likely can wait to have surgery. Your hernia may get worse, but it may not. In some cases, hernias that are small and painless may never need to be repaired. Follow-up care is a key part of your treatment and safety. Be sure to make and go to all appointments, and call your doctor if you are having problems. It's also a good idea to know your test results and keep a list of the medicines you take. How can you care for yourself at home? · Take pain medicines exactly as directed. ? If the doctor gave you a prescription medicine for pain, take it as prescribed. ? If you are not taking a prescription pain medicine, ask your doctor if you can take an over-the-counter medicine. · Use proper lifting techniques, and avoid heavy lifting if you can. To lift things more safely, bend your knees and let your arms and legs do the work. Keep your back straight, and do not bend over at the waist. Keep the load as close to your body as you can. Move your feet instead of turning or twisting your body. · Lose weight if you are overweight. · Include fruits, vegetables, legumes, and whole grains in your diet each day. These foods are high in fiber and will make it easier to avoid straining during bowel movements. · Do not smoke. Smoking can cause coughing, which can cause your hernia to bulge. If you need help quitting, talk to your doctor about stop-smoking programs and medicines. These can increase your chances of quitting for good. When should you call for help? Call your doctor now or seek immediate medical care if:  · You have new or worse belly pain. · You are vomiting. · You cannot pass stools or gas. · You cannot push the hernia back into place with gentle pressure when you are lying down. · The area over the hernia turns red or becomes tender. Watch closely for changes in your health, and be sure to contact your doctor if you have any problems. Where can you learn more? Go to http://yusuf-miesha.info/  Enter P381 in the search box to learn more about \"Inguinal Hernia: Care Instructions. \"  Current as of: August 12, 2019               Content Version: 12.5  © 3473-5546 Healthwise, Incorporated. Care instructions adapted under license by [employer] (which disclaims liability or warranty for this information). If you have questions about a medical condition or this instruction, always ask your healthcare professional. [place] [address] any warranty or liability for your use of this information.

## 2020-09-15 ENCOUNTER — OFFICE VISIT (OUTPATIENT)
Dept: INTERNAL MEDICINE CLINIC | Age: 50
End: 2020-09-15

## 2020-09-15 VITALS
DIASTOLIC BLOOD PRESSURE: 74 MMHG | SYSTOLIC BLOOD PRESSURE: 132 MMHG | OXYGEN SATURATION: 99 % | BODY MASS INDEX: 30.37 KG/M2 | HEART RATE: 45 BPM | HEIGHT: 66 IN | WEIGHT: 189 LBS | TEMPERATURE: 97.2 F | RESPIRATION RATE: 20 BRPM

## 2020-09-15 DIAGNOSIS — J30.1 SEASONAL ALLERGIC RHINITIS DUE TO POLLEN: ICD-10-CM

## 2020-09-15 DIAGNOSIS — Z00.00 ROUTINE MEDICAL EXAM: Primary | ICD-10-CM

## 2020-09-15 RX ORDER — SILDENAFIL 100 MG/1
100 TABLET, FILM COATED ORAL
Qty: 10 TAB | Refills: 5 | Status: SHIPPED | OUTPATIENT
Start: 2020-09-15 | End: 2021-12-20 | Stop reason: SDUPTHER

## 2020-09-15 NOTE — PATIENT INSTRUCTIONS
A Healthy Lifestyle: Care Instructions  Your Care Instructions     A healthy lifestyle can help you feel good, stay at a healthy weight, and have plenty of energy for both work and play. A healthy lifestyle is something you can share with your whole family. A healthy lifestyle also can lower your risk for serious health problems, such as high blood pressure, heart disease, and diabetes. You can follow a few steps listed below to improve your health and the health of your family. Follow-up care is a key part of your treatment and safety. Be sure to make and go to all appointments, and call your doctor if you are having problems. It's also a good idea to know your test results and keep a list of the medicines you take. How can you care for yourself at home? · Do not eat too much sugar, fat, or fast foods. You can still have dessert and treats now and then. The goal is moderation. · Start small to improve your eating habits. Pay attention to portion sizes, drink less juice and soda pop, and eat more fruits and vegetables. ? Eat a healthy amount of food. A 3-ounce serving of meat, for example, is about the size of a deck of cards. Fill the rest of your plate with vegetables and whole grains. ? Limit the amount of soda and sports drinks you have every day. Drink more water when you are thirsty. ? Eat at least 5 servings of fruits and vegetables every day. It may seem like a lot, but it is not hard to reach this goal. A serving or helping is 1 piece of fruit, 1 cup of vegetables, or 2 cups of leafy, raw vegetables. Have an apple or some carrot sticks as an afternoon snack instead of a candy bar. Try to have fruits and/or vegetables at every meal.  · Make exercise part of your daily routine. You may want to start with simple activities, such as walking, bicycling, or slow swimming. Try to be active 30 to 60 minutes every day. You do not need to do all 30 to 60 minutes all at once.  For example, you can exercise 3 times a day for 10 or 20 minutes. Moderate exercise is safe for most people, but it is always a good idea to talk to your doctor before starting an exercise program.  · Keep moving. Brigdia Green the lawn, work in the garden, or [employer]. Take the stairs instead of the elevator at work. · If you smoke, quit. People who smoke have an increased risk for heart attack, stroke, cancer, and other lung illnesses. Quitting is hard, but there are ways to boost your chance of quitting tobacco for good. ? Use nicotine gum, patches, or lozenges. ? Ask your doctor about stop-smoking programs and medicines. ? Keep trying. In addition to reducing your risk of diseases in the future, you will notice some benefits soon after you stop using tobacco. If you have shortness of breath or asthma symptoms, they will likely get better within a few weeks after you quit. · Limit how much alcohol you drink. Moderate amounts of alcohol (up to 2 drinks a day for men, 1 drink a day for women) are okay. But drinking too much can lead to liver problems, high blood pressure, and other health problems. Family health  If you have a family, there are many things you can do together to improve your health. · Eat meals together as a family as often as possible. · Eat healthy foods. This includes fruits, vegetables, lean meats and dairy, and whole grains. · Include your family in your fitness plan. Most people think of activities such as jogging or tennis as the way to fitness, but there are many ways you and your family can be more active. Anything that makes you breathe hard and gets your heart pumping is exercise. Here are some tips:  ? Walk to do errands or to take your child to school or the bus.  ? Go for a family bike ride after dinner instead of watching TV. Where can you learn more?   Go to http://www.gray.com/  Enter V939 in the search box to learn more about \"A Healthy Lifestyle: Care Instructions. \"  Current as of: January 31, 2020               Content Version: 12.6  © 5612-5508 [employer]. Care instructions adapted under license by [employer] (which disclaims liability or warranty for this information). If you have questions about a medical condition or this instruction, always ask your healthcare professional. [place] [address] any warranty or liability for your use of this information.

## 2020-09-15 NOTE — PROGRESS NOTES
Subjective:   Matthias Birmingham is a 52 y.o. male presenting for his annual checkup. ROS:  Feeling well. No dyspnea or chest pain on exertion. No abdominal pain, change in bowel habits, black or bloody stools. No urinary tract or prostatic symptoms. No neurological complaints. Specific concerns today: pt's allergies and asthma is well controlled on Singulair. Pt rarely uses Flovent for asthma. Pt uses Nasal steroids and oral antihistamines as needed for allergies but they are much better controlled now that he is taking allergy shots. Pt has had some mild ED for which he will use Viagra. Patient Active Problem List   Diagnosis Code    H/O: pneumonia Z87.01    Asthma J45.909    Sinus bradycardia on ECG R00.1    Gastritis K29.70     Current Outpatient Medications   Medication Sig Dispense Refill    sildenafil citrate (Viagra) 100 mg tablet Take 1 Tab by mouth daily as needed (ED). 10 Tab 5    fluticasone propionate (FLOVENT HFA) 220 mcg/actuation inhaler Take 2 Puffs by inhalation two (2) times a day. 1 Inhaler 1    multivitamin (ONE A DAY) tablet Take 1 Tab by mouth daily.  mometasone (NASONEX) 50 mcg/actuation nasal spray 2 Sprays by Both Nostrils route daily. 1 Container 11    fexofenadine-pseudoephedrine (ALLEGRA-D)  mg Tb12 Take 1 Tab by mouth every twelve (12) hours.       OTHER Allergy injections/Dr Noa Ni      VENTOLIN HFA 90 mcg/actuation inhaler   4    montelukast (SINGULAIR) 10 mg tablet   0        Lab Results   Component Value Date/Time    WBC 4.5 08/20/2020 09:01 AM    HGB 13.9 08/20/2020 09:01 AM    HCT 43.7 08/20/2020 09:01 AM    PLATELET 207 62/53/7676 09:01 AM    MCV 94 08/20/2020 09:01 AM     Lab Results   Component Value Date/Time    Cholesterol, total 182 08/20/2020 09:01 AM    HDL Cholesterol 76 08/20/2020 09:01 AM    LDL, calculated 92 08/20/2020 09:01 AM    Triglyceride 69 08/20/2020 09:01 AM     Lab Results   Component Value Date/Time    Sodium 141 08/20/2020 09:01 AM    Potassium 4.6 08/20/2020 09:01 AM    Chloride 101 08/20/2020 09:01 AM    CO2 28 08/20/2020 09:01 AM    Anion gap 12.3 08/20/2020 09:01 AM    Glucose 102 (H) 08/20/2020 09:01 AM    BUN 15 08/20/2020 09:01 AM    Creatinine 1.0 08/20/2020 09:01 AM    Calcium 9.8 08/20/2020 09:01 AM    Bilirubin, total 0.5 08/20/2020 09:01 AM    ALT (SGPT) 17 08/20/2020 09:01 AM    Alk. phosphatase 42 08/20/2020 09:01 AM    Protein, total 6.8 08/20/2020 09:01 AM    Albumin 4.6 08/20/2020 09:01 AM    Globulin 2.2 08/20/2020 09:01 AM    A-G Ratio 2.1 08/20/2020 09:01 AM                 Objective:     Visit Vitals  /74 (BP 1 Location: Left arm, BP Patient Position: Sitting)   Pulse (!) 45   Temp 97.2 °F (36.2 °C) (Temporal)   Resp 20   Ht 5' 6\" (1.676 m)   Wt 189 lb (85.7 kg)   SpO2 99%   BMI 30.51 kg/m²     The patient appears well, alert, oriented x 3, in no distress. ENT normal.  Neck supple. No adenopathy or thyromegaly. JOHN. Lungs are clear, good air entry, no wheezes, rhonchi or rales. S1 and S2 normal, no murmurs, regular rate and rhythm. Abdomen is soft without tenderness, guarding, mass or organomegaly. Extremities show no edema, normal peripheral pulses. Neurological is normal without focal findings. Assessment/Plan:   healthy adult male  lose weight, increase physical activity, call if any problems. ICD-10-CM ICD-9-CM    1. Routine medical exam  Z00.00 V70.0 LIPID PANEL      METABOLIC PANEL, COMPREHENSIVE      PSA, DIAGNOSTIC (PROSTATE SPECIFIC AG)      CBC WITH AUTOMATED DIFF   2. Seasonal allergic rhinitis due to pollen  J30.1 477.0  well-controlled antihistamines and nasal steroids along with allergy shots. Follow-up and Dispositions    · Return in about 1 year (around 9/15/2021) for physical, labs 1 week before. Reviewed plan of care. Patient has provided input and agrees with goals.

## 2020-09-15 NOTE — PROGRESS NOTES
Patient is in the office today for a physical.      1. Have you been to the ER, urgent care clinic since your last visit? Hospitalized since your last visit? No     2. Have you seen or consulted any other health care providers outside of the [address] [place] [address] since your last visit? Include any pap smears or colon screening.  No

## 2020-11-10 ENCOUNTER — TELEPHONE (OUTPATIENT)
Dept: INTERNAL MEDICINE CLINIC | Age: 50
End: 2020-11-10

## 2020-11-10 NOTE — TELEPHONE ENCOUNTER
Pt calling, says he has a sinus infection and has gotten rx for it. 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 He has been asked to get a doctors note saying he is under doctors care for sinus infection.        He says someone reported to the school that he coughed

## 2020-11-10 NOTE — LETTER
NOTIFICATION RETURN TO WORK / SCHOOL 
 
11/10/2020 2:54 PM 
 
Mr. Bailey Montoya [address] [address] To Whom It May Concern: 
 
Bailey Montoya is currently under the care of Dolly Bustamante. For a sinus infection If there are questions or concerns please have the patient contact our office. Sincerely, Fabi Tsai MD

## 2020-12-30 DIAGNOSIS — Z20.822 CLOSE EXPOSURE TO COVID-19 VIRUS: ICD-10-CM

## 2020-12-30 DIAGNOSIS — Z20.822 CLOSE EXPOSURE TO COVID-19 VIRUS: Primary | ICD-10-CM

## 2021-01-04 ENCOUNTER — CLINICAL SUPPORT (OUTPATIENT)
Dept: FAMILY MEDICINE CLINIC | Age: 51
End: 2021-01-04

## 2021-01-04 DIAGNOSIS — Z20.828 EXPOSURE TO SARS-ASSOCIATED CORONAVIRUS: Primary | ICD-10-CM

## 2021-01-07 LAB — SARS-COV-2, COV2: NOT DETECTED

## 2021-01-26 ENCOUNTER — TELEPHONE (OUTPATIENT)
Dept: INTERNAL MEDICINE CLINIC | Age: 51
End: 2021-01-26

## 2021-01-26 NOTE — TELEPHONE ENCOUNTER
Left detailed message for patient to monitor for fever, chills, fatigue, body aches and especially any SOB. Any questions call the office.

## 2021-07-16 ENCOUNTER — HOSPITAL ENCOUNTER (OUTPATIENT)
Dept: GENERAL RADIOLOGY | Age: 51
Discharge: HOME | End: 2021-07-16
Payer: COMMERCIAL

## 2021-07-16 ENCOUNTER — OFFICE VISIT (OUTPATIENT)
Dept: INTERNAL MEDICINE CLINIC | Age: 51
End: 2021-07-16
Payer: COMMERCIAL

## 2021-07-16 VITALS
OXYGEN SATURATION: 97 % | BODY MASS INDEX: 30.08 KG/M2 | RESPIRATION RATE: 16 BRPM | TEMPERATURE: 97.6 F | HEIGHT: 66 IN | WEIGHT: 187.2 LBS | DIASTOLIC BLOOD PRESSURE: 70 MMHG | SYSTOLIC BLOOD PRESSURE: 128 MMHG | HEART RATE: 47 BPM

## 2021-07-16 DIAGNOSIS — M25.512 ACUTE PAIN OF LEFT SHOULDER: Primary | ICD-10-CM

## 2021-07-16 DIAGNOSIS — M25.512 ACUTE PAIN OF LEFT SHOULDER: ICD-10-CM

## 2021-07-16 PROCEDURE — 99213 OFFICE O/P EST LOW 20 MIN: CPT | Performed by: INTERNAL MEDICINE

## 2021-07-16 PROCEDURE — 73030 X-RAY EXAM OF SHOULDER: CPT

## 2021-07-16 NOTE — PROGRESS NOTES
1. Have you been to the ER, urgent care clinic since your last visit? Hospitalized since your last visit? No    2. Have you seen or consulted any other health care providers outside of the [address] [place] [address] since your last visit? Include any pap smears or colon screening.  No    Chief Complaint   Patient presents with    Shoulder Pain     left shoulder pain x 4.5 months off/on

## 2021-07-21 NOTE — PROGRESS NOTES
Fran Vivas is a 52 y.o.  male and presents with Shoulder Pain (left )      SUBJECTIVE:    Shoulder Pain  Patient complains of left side shoulder pain. The symptoms began 4 months ago Course of symptoms since onset has been symptoms have progressed to a point and plateaued. . Pain is described as overall severity = moderate, worse with overhead movements and worse at night. Symptoms were incited by repetitive activity, doing chest press in the gym. Patient denies fever. Therapy to date includes rest from doing chest press in the gym. Respiratory ROS: negative for - shortness of breath  Cardiovascular ROS: negative for - chest pain    Current Outpatient Medications   Medication Sig    fexofenadine HCl (ALLEGRA PO) Take  by mouth.  sildenafil citrate (Viagra) 100 mg tablet Take 1 Tab by mouth daily as needed (ED).  multivitamin (ONE A DAY) tablet Take 1 Tab by mouth daily.  OTHER Allergy injections/Dr Tee Garza VENTOLIN HFA 90 mcg/actuation inhaler     montelukast (SINGULAIR) 10 mg tablet     fluticasone propionate (FLOVENT HFA) 220 mcg/actuation inhaler Take 2 Puffs by inhalation two (2) times a day. (Patient not taking: Reported on 7/16/2021)    mometasone (NASONEX) 50 mcg/actuation nasal spray 2 Sprays by Both Nostrils route daily. (Patient not taking: Reported on 7/16/2021)    fexofenadine-pseudoephedrine (ALLEGRA-D)  mg Tb12 Take 1 Tab by mouth every twelve (12) hours. (Patient not taking: Reported on 7/16/2021)     No current facility-administered medications for this visit.          OBJECTIVE:  alert, well appearing, and in no distress  Visit Vitals  /70 (BP 1 Location: Left upper arm, BP Patient Position: Sitting, BP Cuff Size: Adult)   Pulse (!) 47   Temp 97.6 °F (36.4 °C) (Temporal)   Resp 16   Ht 5' 6\" (1.676 m)   Wt 187 lb 3.2 oz (84.9 kg)   SpO2 97%   BMI 30.21 kg/m²      well developed and well nourished  Musculoskeletal -left shoulder with minimal discomfort with internal and external rotation. Assessment/Plan      ICD-10-CM ICD-9-CM    1. Acute pain of left shoulder  M25.512 719.41 REFERRAL TO PHYSICAL THERAPY      XR SHOULDER LT AP/LAT MIN 2 V     Follow-up and Dispositions    · Return if symptoms worsen or fail to improve. Reviewed plan of care. Patient has provided input and agrees with goals.

## 2021-07-22 ENCOUNTER — HOSPITAL ENCOUNTER (OUTPATIENT)
Dept: PHYSICAL THERAPY | Age: 51
Discharge: HOME | End: 2021-07-22
Payer: COMMERCIAL

## 2021-07-22 PROCEDURE — 97535 SELF CARE MNGMENT TRAINING: CPT

## 2021-07-22 PROCEDURE — 97161 PT EVAL LOW COMPLEX 20 MIN: CPT

## 2021-07-22 PROCEDURE — 97110 THERAPEUTIC EXERCISES: CPT

## 2021-07-22 NOTE — PROGRESS NOTES
In [address] [place] [address], [address], [address] [address]  [phone] fax      Plan of Care/ Statement of Necessity for Physical Therapy Services    Patient name: Iliana Quiroga Start of Care: 2021   Referral source: Wendy Walden MD : 1970    Medical Diagnosis: Pain in left shoulder [M25.512]  Payor: Manuel Call / Plan: [place] [employer] P[place] / Product Type: PPO /  Onset Date:March 3-4, 2021    Treatment Diagnosis: left shoulder pain   Prior Hospitalization: see medical history Provider#: [id number]   Medications: Verified on Patient summary List    Comorbidities: asthma   Prior Level of Function: I all areas of ADLS and activities, no AD, working, doing things at home and in the community, weightlifting, , running, hiking, biking and outdoor activities     The Plan of Care and following information is based on the information from the initial evaluation. Assessment/ key information: 53 YO male diagnosed as above and with S/S consistent with above diagnosis presents to skilled outpatient PT CCO pain , almost tingling and numbness on top of the left shoulder when weight training, also with initial 10 minutes of freestyle swimming. With flies get grinding and with bench press gets popping. Onset March 3-4, 2021 and has continued since then especially with weight lifting. He is right hand dominant. Patient with LOM, decrease strength,+ impingement signs, decrease flexibility left shoulder. Patient demonstrates the potential to make gains with improved ROM, strength, endurance/activity tolerance, functional FOTO survey score   and all within a reasonable time frame so as to increase their functional independence with ADLs and activities for carryover to improved tolerance to weight lifting and activities outdoors, household and community activities and work demands.  Patient requires skilled Physical Therapy so as to monitor their response to and modify their treatment plan accordingly. Patient appears to be an appropriate candidate for skilled outpatient Physical Therapy.     Evaluation Complexity History MEDIUM  Complexity : 1-2 comorbidities / personal factors will impact the outcome/ POC ; Examination HIGH Complexity : 4+ Standardized tests and measures addressing body structure, function, activity limitation and / or participation in recreation  ;Presentation LOW Complexity : Stable, uncomplicated  ;Clinical Decision Making MEDIUM Complexity : FOTO score of 26-74  Overall Complexity Rating: LOW   Problem List: pain affecting function, decrease ROM, decrease strength, decrease ADL/ functional abilitiies, decrease activity tolerance, decrease flexibility/ joint mobility and other FOTO 63   Treatment Plan may include any combination of the following: Therapeutic exercise, Therapeutic activities, Neuromuscular re-education, Physical agent/modality, Manual therapy, Patient education, Self Care training and Home safety training  Patient / Family readiness to learn indicated by: asking questions, trying to perform skills and interest  Persons(s) to be included in education: patient (P)  Barriers to Learning/Limitations: None  Patient Goal (s): overcome the pain, heal , overall goal is to return to weight training and lose weight back to 175#.   Patient Self Reported Health Status: good  Rehabilitation Potential: good    Short Term Goals: To be accomplished in 4 treatments:   1 patient will have established and be I with HEP to aid with self management at discharge   EVAL issued HEP   CURRENT   2 patient will have pain 2-3/10 to aid with increase tolerance to swimming   EVAL 3-4 and pain worse with initial 10 minutes of swimming   CURRENT    Long Term Goals:  To be accomplished in 8 treatments:   1 patient will have FOTO improved to 76 to show projected gains with increase tolerance to function at home and with recreation   EVAL 63   CURRENT   2 patient will have abduction left shoulder 165 to aid with increase tolerance to swimming and weight lifting   EVAL 146, tight   CURRENT   3 patient will have overall improvement at 75% to aid with resuming all bench pressing and flies with less pain   EVAL grinding with flies and popping with bench press causing overall increase pain left shoulder   CURRENT   4. Patient will have 8-10# overhead reach 5-10X with no increase pain for carryover to household activities   EVAL per FOTO some difficulty   CURRENT  Frequency / Duration: Patient to be seen 2 times per week for 4 weeks. Patient/ Caregiver education and instruction: Diagnosis, prognosis, self care, activity modification and exercises   [x]  Plan of care has been reviewed with JOSEFINA Carter, PT 7/22/2021 11:59 AM  ________________________________________________________________________    I certify that the above Therapy Services are being furnished while the patient is under my care. I agree with the treatment plan and certify that this therapy is necessary.     [de-identified] Signature:____________Date:_________TIME:________     Mariya Pate MD  ** Signature, Date and Time must be completed for valid certification **      Please sign and return to In [address] [place] [address], [address] [place] [address], [address] [place] [address],[facility] 300  [phone] fax

## 2021-07-22 NOTE — PROGRESS NOTES
PT DAILY TREATMENT NOTE/SHOULDER EVAL     Patient Name: Yanelis Mack  Date:2021  : 1970  [x]  Patient  Verified  Payor: Ynes Hitchcock / Plan: [place] [employer] PPO / Product Type: PPO /    In time:1203  Out time:1245  Total Treatment Time (min): 42  Visit #: 1 of 8         Treatment Area: Pain in left shoulder [M25.512]    SUBJECTIVE  Pain Level (0-10 scale): 3-4  []constant [x]intermittent [x]improving []worsening []no change since onset  Better ice and swimming worse with weightlifiting  Any medication changes, allergies to medications, adverse drug reactions, diagnosis change, or new procedure performed?: [x] No    [] Yes (see summary sheet for update)  Subjective functional status/changes:     PLOF: I all areas of ADLS and activities, no AD, working, doing things at home and in the community, weightlifting, , running, hiking, biking and outdoor activities  Limitations to PLOF: pain  Mechanism of Injury: March 3-4, 2021 while bench press. weight training,   Current symptoms/Complaints: 53 YO male diagnosed as above and with S/S consistent with above diagnosis presents to skilled outpatient PT CCO pain , almost tingling and numbness on top of the left shoulder when weight training, also with initial 10 minutes of freestyle swimming. With flies get grinding and with bench press gets popping. Onset March 3-4, 2021 and has continued since then especially with weight lifting. He is right hand dominant. Previous Treatment/Compliance: MD, Ice, x-ray  PMHx/Surgical Hx: asthma  Work Hx:  with [employer] Situation: lives in a townhouse, not alone  Pt Goals: overcome the pain, heal , overall goal is to return to weight training and lose weight back to 175#.   Barriers: [x]pain []financial []time []transportation []other  Motivation:Good  Substance use: []Alcohol []Tobacco []other:   FABQ Score: []low []elevate  Cognition: A & O x 4    Other:    OBJECTIVE/EXAMINATION  Domestic Life: work, household and community activities, [employer] lifting, biking, running, hiking  Activity/Recreational Limitations: pain  Mobility: I  Self Care: I     19 min [x]Eval                  []Re-Eval       15 min Therapeutic Exercise:  [x] See flow sheet :   Rationale: increase ROM, increase strength and improve coordination to improve the patients ability to tolerate ADLS and activities    8 min Self care:  []  See flow sheet :   Rationale: POC, GOALS, FOTO, anatomy, ice  to improve the patients ability to aid with increase tolerance to ADLs and activities            With   [] TE   [] TA   [] neuro   [] other: Patient Education: [x] Review HEP    [] Progressed/Changed HEP based on:   [] positioning   [] body mechanics   [] transfers   [] heat/ice application    [] other:      Other Objective/Functional Measures:      Physical Therapy Evaluation - Shoulder    Posture: [] Poor    [] Fair    [x] Good    Describe:    ROM:  [] Unable to assess at this time                                           AROM                                                              PROM   Left Right  Left Right   Flexion 160 tight,  Flexion      Extension   Extension     Scaption/ tight,  Scaptin/ABD     ER @ 0 Degrees   ER @ 0 Degrees     ER @ 90 Degrees   ER @ 90 Degrees     IR @ 0 Degrees   IR @ 0 Degrees     Right UE HBH able , HBB to Lower Tsp   Left UE HBH able, HBB to LS sp tight   End Feel / Painful Arc:    Strength:   [] Unable to assess at this time                                                                            L (1-5) R (1-5) Pain   Flexors 4+ pain top of left shoulder, AC  5 [] Yes   [] No   Abductors 4+ 5 [] Yes   [] No   External Rotators 4+ pop with moving into ER from IR 5 [] Yes   [] No   Internal Rotators 4+ 5 [] Yes   [] No   Supraspinatus   [] Yes   [] No   Serratus Anterior   [] Yes   [] No   Lower Trapezius   [] Yes   [] No   Elbow Flexion   [] Yes   [] No   Elbow Extension   [] Yes   [] No       Scapulohumoral Control / Rhythm:  Able to eccentrically lower with good control? Left: [x] Yes   [] No     Right: [x] Yes   [] No    Accessory Motions:    Palpation  Denies TTP at subacromial bursa  [x] Min  [x] Mod  [] Severe    Location:TTP left shoulder AC joint,   [] Min  [] Mod  [] Severe    Location:  [] Min  [] Mod  [] Severe    Location:    Adson's Test  [] Pos   [] Neg Yergason's Test [] Pos   [] Neg  Nely's Test  [] Pos   [] Neg Gardiner's Sign [] Pos   [] Neg  Neer's Test  [x] Pos   [] Neg Clunk Test  [] Pos   [] Neg  Hawkin's Test  [x] Pos   [] Neg AC Joint  [x] Pos   [] Neg  Speed's Test  [] Pos   [x] Neg SC Joint  [] Pos   [] Neg  Empty Can  [x] Pos   [] Neg Pectoral Tightness [] Pos   [] Neg  Anterior Apprehension [] Pos   [] Neg   Posterior Apprehension [] Pos   [] Neg      Other Tests / Comments:        Pain Level (0-10 scale) post treatment: 3-4    ASSESSMENT/Changes in Function: Patient demonstrates the potential to make gains with improved ROM, strength, endurance/activity tolerance, functional FOTO survey score   and all within a reasonable time frame so as to increase their functional independence with ADLs and activities for carryover to improved tolerance to weight lifting and activities outdoors, household and community activities and work demands. Patient requires skilled Physical Therapy so as to monitor their response to and modify their treatment plan accordingly. Patient appears to be an appropriate candidate for skilled outpatient Physical Therapy. Patient will continue to benefit from skilled PT services to modify and progress therapeutic interventions, address ROM deficits, address strength deficits, analyze and address soft tissue restrictions, analyze and cue movement patterns, analyze and modify body mechanics/ergonomics, assess and modify postural abnormalities and instruct in home and community integration to attain remaining goals.      [x]  See Plan of Care  [] See progress note/recertification  []  See Discharge Summary         Progress towards goals / Updated goals:       PLAN  []  Upgrade activities as tolerated     []  Continue plan of care  []  Update interventions per flow sheet       []  Discharge due to:_  []  Other:_      Stella Wang, PT 7/22/2021  11:58 AM

## 2021-07-28 ENCOUNTER — HOSPITAL ENCOUNTER (OUTPATIENT)
Dept: PHYSICAL THERAPY | Age: 51
Discharge: HOME | End: 2021-07-28
Payer: COMMERCIAL

## 2021-07-28 PROCEDURE — 97530 THERAPEUTIC ACTIVITIES: CPT

## 2021-07-28 PROCEDURE — 97140 MANUAL THERAPY 1/> REGIONS: CPT

## 2021-07-28 PROCEDURE — 97110 THERAPEUTIC EXERCISES: CPT

## 2021-07-28 NOTE — PROGRESS NOTES
PT DAILY TREATMENT NOTE     Patient Name: Sukhi Baptiste  Date:2021  : 1970  [x]  Patient  Verified  Payor: Shade Sheldon / Plan: [place] [employer] / Product Type: PPO /    In time:8:19  Out time: 9:08  Total Treatment Time (min): 49  Visit #:1 of 8    Medicare/BCBS Only   Total Timed Codes (min):   1:1 Treatment Time:         Treatment Area: Pain in left shoulder [M25.512]    SUBJECTIVE  Pain Level (0-10 scale): 1-3  Any medication changes, allergies to medications, adverse drug reactions, diagnosis change, or new procedure performed?: [x] No    [] Yes (see summary sheet for update)  Subjective functional status/changes:   [] No changes reported  \"Littl pain. \"    OBJECTIVE    Modality rationale: decrease edema, decrease inflammation, decrease pain, increase tissue extensibility and increase muscle contraction/control to improve the patients ability to perform ADL    Min Type Additional Details    [] Estim:  []Unatt       []IFC  []Premod                        []Other:  []w/ice   []w/heat  Position:  Location:    [] Estim: []Att    []TENS instruct  []NMES                    []Other:  []w/US   []w/ice   []w/heat  Position:  Location:    []  Traction: [] Cervical       []Lumbar                       [] Prone          []Supine                       []Intermittent   []Continuous Lbs:  [] before manual  [] after manual    []  Ultrasound: []Continuous   [] Pulsed                           []1MHz   []3MHz W/cm2:  Location:    []  Iontophoresis with dexamethasone         Location: [] Take home patch   [] In clinic   10 [x]  Ice     []  heat  []  Ice massage  []  Laser   []  Anodyne Position:long sit  Location:left shoulder    []  Laser with stim  []  Other:  Position:  Location:    []  Vasopneumatic Device    []  Right     []  Left  Pre-treatment girth:  Post-treatment girth:  Measured at (location):  Pressure:       [] lo [] med [] hi   Temperature: [] lo [] med [] hi   [x] Skin assessment post-treatment: [x]intact []redness- no adverse reaction    []redness - adverse reaction:      min []Eval                  []Re-Eval       19 min Therapeutic Exercise:  [x] See flow sheet :   Rationale: increase ROM, increase strength and improve coordination to improve the patients ability to perform ADL    10 min Therapeutic Activity:  [x]  See flow sheet :   Rationale: increase ROM, increase strength and improve coordination  to improve the patients ability to perform ADL       min Neuromuscular Re-education:  []  See flow sheet :   Rationale: increase ROM, increase strength, improve coordination, improve balance and increase proprioception  to improve the patients ability to perform ADL     10 min Manual Therapy:  [address] [facility] mob 3 with distraction at end range   The manual therapy interventions were performed at a separate and distinct time from the therapeutic activities interventions. Rationale: decrease pain, increase ROM, increase tissue extensibility, decrease edema , decrease trigger points and increase postural awareness to perform ADL      min Gait Training:  ___ feet with ___ device on level surfaces with ___ level of assist   Rationale: With   [x] TE   [] TA   [] neuro   [] other: Patient Education: [x] Review HEP    [] Progressed/Changed HEP based on:   [] positioning   [] body mechanics   [] transfers   [] heat/ice application    [] other:      Other Objective/Functional Measures:      Pain Level (0-10 scale) post treatment . 5-1    ASSESSMENT/Changes in Function: Pt completed each there ex fairly well with cues. Pt experienced popping at shoulder during manual which made his shoulder feel better. Pt was questionable about using weights with SA punches. Therapist discussed with pt on increasing weights gradually. Pt understood.     Patient will continue to benefit from skilled PT services to address functional mobility deficits, address ROM deficits, address strength deficits, analyze and address soft tissue restrictions, analyze and cue movement patterns, analyze and modify body mechanics/ergonomics, assess and modify postural abnormalities and instruct in home and community integration to attain remaining goals. [x]  See Plan of Care  []  See progress note/recertification  []  See Discharge Summary         Progress towards goals / Updated goals:    1 patient will have established and be I with HEP to aid with self management at discharge               EVAL issued HEP               CURRENT  1x day  7/28               2 patient will have pain 2-3/10 to aid with increase tolerance to swimming               EVAL 3-4 and pain worse with initial 10 minutes of swimming               CURRENT   Long Term Goals:  To be accomplished in 8 treatments:               1 patient will have FOTO improved to 76 to show projected gains with increase tolerance to function at home and with recreation               EVAL 63               CURRENT               2 patient will have abduction left shoulder 165 to aid with increase tolerance to swimming and weight lifting               EVAL 146, tight               CURRENT               3 patient will have overall improvement at 75% to aid with resuming all bench pressing and flies with less pain               EVAL grinding with flies and popping with bench press causing overall increase pain left shoulder               CURRENT               4. Patient will have 8-10# overhead reach 5-10X with no increase pain for carryover to household activities               EVAL per FOTO some difficulty               CURRENT    PLAN  [x]  Upgrade activities as tolerated     []  Continue plan of care  []  Update interventions per flow sheet       []  Discharge due to:_  []  Other:_      Gabriella Dwyer, JOSEFINA 7/28/2021  8:25 AM    Future Appointments   Date Time Provider Viki Mora   8/2/2021  3:00 PM Kelsey Strong PT [facility]   8/4/2021  8:15 AM Jennifer Cavanaugh [facility]   8/9/2021 3:45 PM [facility] PT [place] [facility]   8/11/2021  8:15 AM Gabriella Dwyer, PTA [facility]   8/16/2021  9:00 AM Ignacio Alvarez, PT [facility]   8/18/2021  9:00 AM Stacia Ortega, PTA [facility]   9/14/2021  7:55 AM [facility] NURSE VISIT [facility] AMB   9/21/2021  3:00 PM Luis Saldaña MD [facility] AM[facility]

## 2021-08-02 ENCOUNTER — HOSPITAL ENCOUNTER (OUTPATIENT)
Dept: PHYSICAL THERAPY | Age: 51
Discharge: HOME | End: 2021-08-02
Payer: COMMERCIAL

## 2021-08-02 PROCEDURE — 97110 THERAPEUTIC EXERCISES: CPT

## 2021-08-02 PROCEDURE — 97140 MANUAL THERAPY 1/> REGIONS: CPT

## 2021-08-02 NOTE — PROGRESS NOTES
PT DAILY TREATMENT NOTE     Patient Name: Rebeka Mejia  QEQA:[id number]  : 1970  [x]  Patient  Verified  Payor: Antonio Reyes / [place]: [place] [employer] [place] / Product Type: PPO /    In time: 3:00  Out time: 4:00  Total Treatment Time (min): 60  Visit #: 3 of 8    Treatment Area: Pain in left shoulder [M25.512]    SUBJECTIVE  Pain Level (0-10 scale): 1/10  Any medication changes, allergies to medications, adverse drug reactions, diagnosis change, or new procedure performed?: [x] No    [] Yes (see summary sheet for update)  Subjective functional status/changes:   [] No changes reported  Patient endorses continued compliance with HEP, performing 1x per day. Has not returned to bench press but has been going to the gym frequently, running 5 miles 3x/week, and swimming.     OBJECTIVE    Modality rationale: decrease edema, decrease inflammation and decrease pain to improve the patients ability to perform ADLs and exercise for health and wellness without being limited by left shoulder pain    Min Type Additional Details    [] Estim:  []Unatt       []IFC  []Premod                        []Other:  []w/ice   []w/heat  Position:  Location:    [] Estim: []Att    []TENS instruct  []NMES                    []Other:  []w/US   []w/ice   []w/heat  Position:  Location:    []  Traction: [] Cervical       []Lumbar                       [] Prone          []Supine                       []Intermittent   []Continuous Lbs:  [] before manual  [] after manual    []  Ultrasound: []Continuous   [] Pulsed                           []1MHz   []3MHz W/cm2:  Location:    []  Iontophoresis with dexamethasone         Location: [] Take home patch   [] In clinic   10 [x]  Ice post    []  heat  []  Ice massage  []  Laser   []  Anodyne Position: reclined  Location: left shoulder    []  Laser with stim  []  Other:  Position:  Location:    []  Vasopneumatic Device    []  Right     []  Left  Pre-treatment girth:  Post-treatment girth:  Measured at (location):  Pressure:       [] lo [] med [] hi   Temperature: [] lo [] med [] hi   [] Skin assessment post-treatment:  []intact []redness- no adverse reaction    []redness - adverse reaction:     40 min Therapeutic Exercise:  [x] See flow sheet :   Rationale: increase ROM, increase strength and improve coordination to improve the patients ability to perform daily activities and return to exercise without pain or limitation     10 min Manual Therapy:  PROM with overpressure into flexion, abduction, and ER; Grade III mobilizations with movement into ER   The manual therapy interventions were performed at a separate and distinct time from the therapeutic activities interventions. Rationale: decrease pain, increase ROM and increase tissue extensibility to improve the patients ability to perform daily activities and return to exercise without pain or limitation          With   [x] TE   [] TA   [] neuro   [] other: Patient Education: [x] Review HEP    [] Progressed/Changed HEP based on:   [] positioning   [] body mechanics   [] transfers   [] heat/ice application    [] other:      Other Objective/Functional Measures: Therapist added [place] IR, ER, and rows as well as body blade     Pain Level (0-10 scale) post treatment: 1/10    ASSESSMENT/Changes in Function: Patient participated well throughout session and tolerated new interventions without an increase in shoulder pain. Therapist was able to add exercises at [facility] and body blade in order to strengthen rotator cuff muscles and enhance shoulder stability. Patient will continue to benefit from PT to improve shoulder mobility and strength.      Patient will continue to benefit from skilled PT services to modify and progress therapeutic interventions, address functional mobility deficits, address ROM deficits, address strength deficits, analyze and address soft tissue restrictions, analyze and cue movement patterns, analyze and modify body mechanics/ergonomics, assess and modify postural abnormalities and instruct in home and community integration to attain remaining goals. [x]  See Plan of Care  []  See progress note/recertification  []  See Discharge Summary         Progress towards goals / Updated goals:  1 patient will have established and be I with HEP to aid with self management at discharge               EVAL issued HEP               OPDXCFD  1x day - progressing  2 patient will have pain 2-3/10 to aid with increase tolerance to swimming               EVAL 3-4 and pain worse with initial 10 minutes of swimming               CURRENT 1/10 - met  Long Term Goals: To be accomplished in 8 treatments:  1 patient will have FOTO improved to 76 to show projected gains with increase tolerance to function at home and with recreation               EVAL 63               CURRENT will assess at next re-eval - progressing   2 patient will have abduction left shoulder 165 to aid with increase tolerance to swimming and weight lifting               EVAL 146, tight               [id number] will assess at next visit - progressing  3 patient will have overall improvement at 75% to aid with resuming all bench pressing and flies with less pain               EVAL grinding with flies and popping with bench press causing overall increase pain left shoulder               YYZASBA patient with symptom improvement based on drop in pain rating from 3-4/10 to 1/10 today, has not initiated bench press since beginning PT - progressing   4.  Patient will have 8-10# overhead reach 5-10X with no increase pain for carryover to household activities               EVAL per FOTO some difficulty               HUOEOHO not initiated - progressing     PLAN  [x]  Upgrade activities as tolerated     [x]  Continue plan of care  [x]  Update interventions per flow sheet       []  Discharge due to:_  []  Other:_      Mark Hernandez, PT 8/2/2021  2:56 PM    Future Appointments   Date Time Provider Viki Mora 8/2/2021  3:00 PM Brianna Gonsalez, PT [facility]   8/4/2021  8:15 AM Joanna Josiah, PT [facility]   8/9/2021  3:45 PM [facility]   8/11/2021  8:15 AM Gabriella Dwyer, PTA [facility]   8/16/2021  9:00 AM Brianna Gonsalez, PT [facility]   8/18/2021  9:00 AM Gonzalez Bolton, PTA [facility]   9/14/2021  7:55 AM [facility] NURSE VISIT [facility] BS AMB   9/28/2021  3:45 PM Horacio Saldaña MD [facility] BS AMB

## 2021-08-04 ENCOUNTER — HOSPITAL ENCOUNTER (OUTPATIENT)
Dept: PHYSICAL THERAPY | Age: 51
Discharge: HOME | End: 2021-08-04
Payer: COMMERCIAL

## 2021-08-04 PROCEDURE — 97140 MANUAL THERAPY 1/> REGIONS: CPT

## 2021-08-04 PROCEDURE — 97110 THERAPEUTIC EXERCISES: CPT

## 2021-08-04 PROCEDURE — 97530 THERAPEUTIC ACTIVITIES: CPT

## 2021-08-04 NOTE — PROGRESS NOTES
PT DAILY TREATMENT NOTE     Patient Name: Yanelis Mack  [id number]  : 1970  [x]  Patient  Verified  Payor: Ynes Hitchcock / [place]: [place] [employer] / Product Type: PPO /    In time:820  Out time:909  Total Treatment Time (min): 49  Visit #: 4 of 8     Treatment Area: Pain in left shoulder [M25.512]    SUBJECTIVE  Pain Level (0-10 scale): 1-2  Any medication changes, allergies to medications, adverse drug reactions, diagnosis change, or new procedure performed?: [x] No    [] Yes (see summary sheet for update)  Subjective functional status/changes:   [] No changes reported  \"It is feeling better. I have been doing the exercises at home. I am going to go to the pool today after I leave. \"    OBJECTIVE    Modality rationale: decrease pain and increase tissue extensibility to improve the patients ability to tolerate ADLS and activities   Min Type Additional Details    [] Estim:  []Unatt       []IFC  []Premod                        []Other:  []w/ice   []w/heat  Position:  Location:    [] Estim: []Att    []TENS instruct  []NMES                    []Other:  []w/US   []w/ice   []w/heat  Position:  Location:    []  Traction: [] Cervical       []Lumbar                       [] Prone          []Supine                       []Intermittent   []Continuous Lbs:  [] before manual  [] after manual    []  Ultrasound: []Continuous   [] Pulsed                           []1MHz   []3MHz W/cm2:  Location:    []  Iontophoresis with dexamethasone         Location: [] Take home patch   [] In clinic   10 [x]  Ice post      []  heat  []  Ice massage  []  Laser   []  Anodyne Position:reclined  Location: left shoulder    []  Laser with stim  []  Other:  Position:  Location:    []  Vasopneumatic Device    []  Right     []  Left  Pre-treatment girth:  Post-treatment girth:  Measured at (location):  Pressure:       [] lo [] med [] hi   Temperature: [] lo [] med [] hi   [] Skin assessment post-treatment:  []intact []redness- no adverse reaction    []redness - adverse reaction:          28 min Therapeutic Exercise:  [x] See flow sheet :   Rationale: increase ROM, increase strength and improve coordination to improve the patients ability to tolerate ADLs and activities    10 min Therapeutic Activity:  [x]  See flow sheet :   Rationale: increase ROM, increase strength and improve coordination  to improve the patients ability to tolerate ADLs and activities        8 min Manual Therapy:  ROM all planes with gentle LAD and oscillation and overstretch IR/ER with intermittent popping   The manual therapy interventions were performed at a separate and distinct time from the therapeutic activities interventions. Rationale: decrease pain, increase ROM and increase tissue extensibility to tolerate ADLS and activities           With   [x] TE   [x] TA   [] neuro   [] other: Patient Education: [x] Review HEP    [x] Progressed/Changed HEP based on:   [] positioning   [] body mechanics   [] transfers   [] heat/ice application    [] other:      Other Objective/Functional Measures: VC exercises and technique   [place] resistance increased rows to 28#, IR 13#  ER 11#  Added extensions 1 set 20# and 1 Set 23#    Pain Level (0-10 scale) post treatment: 1    ASSESSMENT/Changes in Function:   tolerated well. Patient will continue to benefit from skilled PT services to modify and progress therapeutic interventions, address ROM deficits, address strength deficits, analyze and address soft tissue restrictions, analyze and cue movement patterns, analyze and modify body mechanics/ergonomics, assess and modify postural abnormalities and instruct in home and community integration to attain remaining goals. [x]  See Plan of Care  []  See progress note/recertification  []  See Discharge Summary         Progress towards goals / Updated goals:  Short Term Goals:  To be accomplished in 4 treatments:   1 patient will have established and be I with HEP to aid with self management at discharge               EVAL issued HEP               CURRENT  1x day - progressing  8/4/21  2 patient will have pain 2-3/10 to aid with increase tolerance to swimming               EVAL 3-4 and pain worse with initial 10 minutes of swimming               CURRENT 1-2 at arrival - tightness 8/4/21  Long Term Goals: To be accomplished in 8 treatments:  1 patient will have FOTO improved to 76 to show projected gains with increase tolerance to function at home and with recreation               EVAL 63               DASVVRV will assess at next re-eval - progressing  8/4/21   2 patient will have abduction left shoulder 165 to aid with increase tolerance to swimming and weight lifting               EVAL 146, tight               IJRZOKG ongoing NA 8/4/21  3 patient will have overall improvement at 75% to aid with resuming all bench pressing and flies with less pain               EVAL grinding with flies and popping with bench press causing overall increase pain left shoulder               XAXRFOU patient with symptom improvement based on drop in pain rating from 3-4/10 to 1-2/10 today, has not initiated bench press since beginning PT - progressing  8/4/21  4.  Patient will have 8-10# overhead reach 5-10X with no increase pain for carryover to household activities               EVAL per FOTO some difficulty               FMYAJEC not initiated - progressing 8/4/21       PLAN  [x]  Upgrade activities as tolerated     [x]  Continue plan of care  []  Update interventions per flow sheet       []  Discharge due to:_  []  Other:_      Pearletha Morning, PT 8/4/2021  8:22 AM    Future Appointments   Date Time Provider Viki Mora   8/9/2021  3:45 PM [facility] PT [facility]   8/11/2021  8:15 AM Gabriella Dwyer PTA [facility]   8/16/2021  9:00 AM Val Gomez PT [facility]   8/18/2021  9:00 AM Nasreen Rg PTA [facility]   9/14/2021  7:55 AM [facility] NURSE VISIT [facility] BS AMB   9/28/2021  3:45 PM Mariya Pate MD [facility] BS AMB

## 2021-08-09 ENCOUNTER — HOSPITAL ENCOUNTER (OUTPATIENT)
Dept: PHYSICAL THERAPY | Age: 51
Discharge: HOME | End: 2021-08-09
Payer: COMMERCIAL

## 2021-08-09 PROCEDURE — 97140 MANUAL THERAPY 1/> REGIONS: CPT

## 2021-08-09 PROCEDURE — 97110 THERAPEUTIC EXERCISES: CPT

## 2021-08-09 NOTE — PROGRESS NOTES
PT DAILY TREATMENT NOTE     Patient Name: Bryanna Packer  OVJB:  : 1970  [x]  Patient  Verified  Payor: Nico Lauren / [place]: [place] / Product Type: PPO /    In time:3:45pm Out time:4:30 pm  Total Treatment Time (min): 45  Visit #: 5 of 8    Medicare/BCBS Only   Total Timed Codes (min):   1:1 Treatment Time:         Treatment Area: Pain in left shoulder [M25.512]    SUBJECTIVE  Pain Level (0-10 scale): 2  Any medication changes, allergies to medications, adverse drug reactions, diagnosis change, or new procedure performed?: [x] No    [] Yes (see summary sheet for update)  Subjective functional status/changes:   [] No changes reported  Patient reports that his shoulder felt great . Patient explains that today his shoulder is tender in some spots.      OBJECTIVE    Modality rationale:    Min Type Additional Details    [] Estim:  []Unatt       []IFC  []Premod                        []Other:  []w/ice   []w/heat  Position:  Location:    [] Estim: []Att    []TENS instruct  []NMES                    []Other:  []w/US   []w/ice   []w/heat  Position:  Location:    []  Traction: [] Cervical       []Lumbar                       [] Prone          []Supine                       []Intermittent   []Continuous Lbs:  [] before manual  [] after manual    []  Ultrasound: []Continuous   [] Pulsed                           []1MHz   []3MHz W/cm2:  Location:    []  Iontophoresis with dexamethasone         Location: [] Take home patch   [] In clinic    []  Ice     []  heat  []  Ice massage  []  Laser   []  Anodyne Position:  Location:    []  Laser with stim  []  Other:  Position:  Location:    []  Vasopneumatic Device    []  Right     []  Left  Pre-treatment girth:  Post-treatment girth:  Measured at (location):  Pressure:       [] lo [] med [] hi   Temperature: [] lo [] med [] hi   [] Skin assessment post-treatment:  []intact []redness- no adverse reaction    []redness - adverse reaction:     30 min Therapeutic Exercise:  [x] See flow sheet :   Rationale: increase ROM, increase strength and flexibility to improve the patients ability to perform functional and recreational tasks with no crepitus or pain. 15 min Manual Therapy:  DTM to shoulder musculature; PROM; MWM with Mulligan belt   The manual therapy interventions were performed at a separate and distinct time from the therapeutic activities interventions. Rationale: increase ROM, increase tissue extensibility and increase postural awareness to decrease patients discomfort with overhead functional activities. With   [] TE   [] TA   [] neuro   [] other: Patient Education: [x] Review HEP    [] Progressed/Changed HEP based on:   [] positioning   [] body mechanics   [] transfers   [] heat/ice application    [] other:      Other Objective/Functional Measures: Added wall angels x15  Increased weights for rows to 35# 2x10  Increased weights for IR/ER   Overhead left with 7# x 10  Pain Level (0-10 scale) post treatment: 1    ASSESSMENT/Changes in Function:   Patient is progressing well towards goals. Progressed exercises, as per flow sheet, to assist with improving overall functional strength and improving scapular strength. Patient did not experience any painful crepitus during today's visit. Patient responded well to today's session, as evident by, no increase in pain and feeling better overall. Patient will continue to benefit from skilled PT services to modify and progress therapeutic interventions, address functional mobility deficits, address ROM deficits, address strength deficits, analyze and address soft tissue restrictions, analyze and cue movement patterns, assess and modify postural abnormalities and instruct in home and community integration to attain remaining goals.      []  See Plan of Care  []  See progress note/recertification  []  See Discharge Summary         Progress towards goals / Updated goals:  Short Term Goals: To be accomplished in 4 treatments:   1 patient will have established and be I with HEP to aid with self management at discharge               EVAL issued HEP               CURRENT  1x day - progressing  8/9/21  2 patient will have pain 2-3/10 to aid with increase tolerance to swimming               EVAL 3-4 and pain worse with initial 10 minutes of swimming               CURRENT 1-2 at arrival - tightness 8/9/21  Long Term Goals: To be accomplished in 8 treatments:  1 patient will have FOTO improved to 76 to show projected gains with increase tolerance to function at home and with recreation               EVAL 63               CURRENT will assess at next re-eval - progressing  8/4/21   2 patient will have abduction left shoulder 165 to aid with increase tolerance to swimming and weight lifting               EVAL 146, tight               CURRENT ongoing NA 8/4/21  3 patient will have overall improvement at 75% to aid with resuming all bench pressing and flies with less pain               EVAL grinding with flies and popping with bench press causing overall increase pain left shoulder               CURRENT patient with symptom improvement based on drop in pain rating from 3-4/10 to 1-2/10 today, has not initiated bench press since beginning PT - progressing  8/9/21  4.  Patient will have 8-10# overhead reach 5-10X with no increase pain for carryover to household activities               EVAL per FOTO some difficulty               LZPBION YHE initiated - progressing 8/9/21    PLAN  [x]  Upgrade activities as tolerated     [x]  Continue plan of care  []  Update interventions per flow sheet       []  Discharge due to:_  []  Other:_      Merlin Gavia, PTA 8/9/2021  3:45 PM    Future Appointments   Date Time Provider Viki Mora   8/11/2021  8:15 AM Steven Toro PTA [facility] [address]   8/16/2021  9:00 AM Karin Waldron PT [facility] [address]   8/18/2021  9:00 AM Steven Toro PTA [facility] [address]   9/14/2021  7:55 AM [facility] NURSE VISIT [facility] BS AMB   9/28/2021  3:45 PM Sina Saldaña MD [facility] BS AMB

## 2021-08-11 ENCOUNTER — HOSPITAL ENCOUNTER (OUTPATIENT)
Dept: PHYSICAL THERAPY | Age: 51
Discharge: HOME | End: 2021-08-11
Payer: COMMERCIAL

## 2021-08-11 PROCEDURE — 97530 THERAPEUTIC ACTIVITIES: CPT

## 2021-08-11 PROCEDURE — 97140 MANUAL THERAPY 1/> REGIONS: CPT

## 2021-08-11 PROCEDURE — 97110 THERAPEUTIC EXERCISES: CPT

## 2021-08-11 PROCEDURE — 97112 NEUROMUSCULAR REEDUCATION: CPT

## 2021-08-11 NOTE — PROGRESS NOTES
PT DAILY TREATMENT NOTE     Patient Name: Amirah Albarran  Date:2021  : 1970  [x]  Patient  Verified  Payor: Cinthya Both / [place]: [place] [employer] / Product Type: PPO /    In time: 8:17  Out time:9:15  Total Treatment Time (min): 59  Visit #: 6 of 8    Medicare/BCBS Only   Total Timed Codes (min):   1:1 Treatment Time:         Treatment Area: Pain in left shoulder [M25.512]    SUBJECTIVE  Pain Level (0-10 scale): 3  Any medication changes, allergies to medications, adverse drug reactions, diagnosis change, or new procedure performed?: [x] No    [] Yes (see summary sheet for update)  Subjective functional status/changes:   [] No changes reported  \"It started hurting this morning. \"    OBJECTIVE    Modality rationale: decrease edema, decrease inflammation, decrease pain, increase tissue extensibility and increase muscle contraction/control to improve the patients ability to perform ADL    Min Type Additional Details    [] Estim:  []Unatt       []IFC  []Premod                        []Other:  []w/ice   []w/heat  Position:  Location:    [] Estim: []Att    []TENS instruct  []NMES                    []Other:  []w/US   []w/ice   []w/heat  Position:  Location:    []  Traction: [] Cervical       []Lumbar                       [] Prone          []Supine                       []Intermittent   []Continuous Lbs:  [] before manual  [] after manual    []  Ultrasound: []Continuous   [] Pulsed                           []1MHz   []3MHz W/cm2:  Location:    []  Iontophoresis with dexamethasone         Location: [] Take home patch   [] In clinic   10 [x]  Ice     []  heat  []  Ice massage  []  Laser   []  Anodyne Position:long sit  Location:left shoulder    []  Laser with stim  []  Other:  Position:  Location:    []  Vasopneumatic Device    []  Right     []  Left  Pre-treatment girth:  Post-treatment girth:  Measured at (location):  Pressure:       [] lo [] med [] hi   Temperature: [] lo [] med [] hi   [x] Skin assessment post-treatment:  [x]intact []redness- no adverse reaction    []redness - adverse reaction:      min []Eval                  []Re-Eval       19 min Therapeutic Exercise:  [x] See flow sheet :   Rationale: increase ROM, increase strength and improve coordination to improve the patients ability to perform ADL     10 min Therapeutic Activity:  [x]  See flow sheet :   Rationale: increase ROM, increase strength and improve coordination  to improve the patients ability to perform ADL      12 min Neuromuscular Re-education:  []  See flow sheet :   Rationale: increase ROM, increase strength, improve coordination, improve balance and increase proprioception  to improve the patients ability to perform ADL     8 min Manual Therapy:  [facility] mob with distraction all plane supine   The manual therapy interventions were performed at a separate and distinct time from the therapeutic activities interventions. Rationale: decrease pain, increase ROM, increase tissue extensibility, decrease edema , decrease trigger points and increase postural awareness to perform ADL      min Gait Training:  ___ feet with ___ device on level surfaces with ___ level of assist   Rationale: With   [] TE   [] TA   [] neuro   [] other: Patient Education: [x] Review HEP    [] Progressed/Changed HEP based on:   [] positioning   [] body mechanics   [] transfers   [] heat/ice application    [] other:      Other Objective/Functional Measures:      Pain Level (0-10 scale) post treatment: 0    ASSESSMENT/Changes in Function: Pt was slightly challenged with Alejo  Rows with 35#. Discussed with pt on decreasing weights on NV. Pt agreed. Overall pt completed each strengthening and stretching there ex fairly well.  Pt presents with inimal   Popping during manual.    Patient will continue to benefit from skilled PT services to address functional mobility deficits, address ROM deficits, address strength deficits, analyze and address soft tissue restrictions, analyze and cue movement patterns, analyze and modify body mechanics/ergonomics, assess and modify postural abnormalities and instruct in home and community integration to attain remaining goals. [x]  See Plan of Care  []  See progress note/recertification  []  See Discharge Summary         Progress towards goals / Updated goals:   1 patient will have established and be I with HEP to aid with self management at discharge               EVAL issued HEP               CURRENT  1x day - progressing  8/9/21  2 patient will have pain 2-3/10 to aid with increase tolerance to swimming               EVAL 3-4 and pain worse with initial 10 minutes of swimming               CURRENT 1-2 at arrival - tightness 8/9/21  Long Term Goals: To be accomplished in 8 treatments:  1 patient will have FOTO improved to 76 to show projected gains with increase tolerance to function at home and with recreation               EVAL 63               CURRENT will assess at next re-eval - progressing  8/4/21   2 patient will have abduction left shoulder 165 to aid with increase tolerance to swimming and weight lifting               EVAL 146, tight               TEUVGXG pt demo ~ 165 with wall slides 8/11/21  3 patient will have overall improvement at 75% to aid with resuming all bench pressing and flies with less pain               EVAL grinding with flies and popping with bench press causing overall increase pain left shoulder               CURRENT patient with symptom improvement based on drop in pain rating from 3-4/10 to 1-2/10 today, has not initiated bench press since beginning PT - progressing  8/9/21  4.  Patient will have 8-10# overhead reach 5-10X with no increase pain for carryover to household activities               EVAL per FOTO some difficulty               CURRENT not initiated - progressing 8/9/21    PLAN  []  Upgrade activities as tolerated     []  Continue plan of care  []  Update interventions per flow sheet []  Discharge due to:_  [x]  Other:_  Overhead reach [address] Sierra, PTA 8/11/2021  8:30 AM    Future Appointments   Date Time Provider Viki Mora   8/16/2021  9:00 AM Celestine Ramirez, PT [facility]   8/18/2021  9:00 AM Horacio Lizarraga PTA [facility]   9/14/2021  7:55 AM [facility] NURSE VISIT [facility]   9/28/2021  3:45 PM Alice Saldaña MD [facility] AMB

## 2021-08-16 ENCOUNTER — HOSPITAL ENCOUNTER (OUTPATIENT)
Dept: PHYSICAL THERAPY | Age: 51
Discharge: HOME | End: 2021-08-16
Payer: COMMERCIAL

## 2021-08-16 PROCEDURE — 97110 THERAPEUTIC EXERCISES: CPT

## 2021-08-16 NOTE — PROGRESS NOTES
PT DAILY TREATMENT NOTE     Patient Name: Yanelis Mack  OHLL:  : 1970  [x]  Patient  Verified  Payor: Ynes Hitchcock / [place]: [place] [employer] / Product Type: PPO /    In time:9:05  Out time:9:47  Total Treatment Time (min): 42  Visit #: 7 of 8    Medicare/BCBS Only   Total Timed Codes (min):   1:1 Treatment Time:         Treatment Area: Pain in left shoulder [M25.512]    SUBJECTIVE  Pain Level (0-10 scale): 1  Any medication changes, allergies to medications, adverse drug reactions, diagnosis change, or new procedure performed?: [x] No    [] Yes (see summary sheet for update)  Subjective functional status/changes:   [] No changes reported  \"Whatever we are doing its helping. \"    OBJECTIVE    Modality rationale: decrease edema, decrease inflammation, decrease pain, increase tissue extensibility and increase muscle contraction/control to improve the patients ability to perform ADL    Min Type Additional Details    [] Estim:  []Unatt       []IFC  []Premod                        []Other:  []w/ice   []w/heat  Position:  Location:    [] Estim: []Att    []TENS instruct  []NMES                    []Other:  []w/US   []w/ice   []w/heat  Position:  Location:    []  Traction: [] Cervical       []Lumbar                       [] Prone          []Supine                       []Intermittent   []Continuous Lbs:  [] before manual  [] after manual    []  Ultrasound: []Continuous   [] Pulsed                           []1MHz   []3MHz W/cm2:  Location:    []  Iontophoresis with dexamethasone         Location: [] Take home patch   [] In clinic    []  Ice     []  heat  []  Ice massage  []  Laser   []  Anodyne Position:  Location:    []  Laser with stim  []  Other:  Position:  Location:    []  Vasopneumatic Device    []  Right     []  Left  Pre-treatment girth:  Post-treatment girth:  Measured at (location):  Pressure:       [] lo [] med [] hi   Temperature: [] lo [] med [] hi   [x] Skin assessment post-treatment: [x]intact []redness- no adverse reaction    []redness - adverse reaction:      min []Eval                  []Re-Eval       36 min Therapeutic Exercise:  [x] See flow sheet :   Rationale: increase ROM, increase strength and improve coordination to improve the patients ability to perform ADL      min Therapeutic Activity:  [x]  See flow sheet :   Rationale: increase ROM, increase strength and improve coordination  to improve the patients ability to perform ADL       min Neuromuscular Re-education:  []  See flow sheet :   Rationale: increase ROM, increase strength, improve coordination, improve balance and increase proprioception  to improve the patients ability to perform ADL     6 min Manual Therapy: [facility] JT mob 4 with distraction all plane semi reclined   The manual therapy interventions were performed at a separate and distinct time from the therapeutic activities interventions. Rationale: decrease pain, increase ROM, increase tissue extensibility, decrease edema , decrease trigger points and increase postural awareness to perform ADL      min Gait Training:  ___ feet with ___ device on level surfaces with ___ level of assist   Rationale: With   [] TE   [] TA   [] neuro   [] other: Patient Education: [x] Review HEP    [] Progressed/Changed HEP based on:   [] positioning   [] body mechanics   [] transfers   [] heat/ice application    [] other:      Other Objective/Functional Measures: added over head reach at top shelf     Pain Level (0-10 scale) post treatment:  0    ASSESSMENT/Changes in Function: Pt presents with good mobility during manual.  Pt completed each there ex fairly well. Short time with manual due to pt had to leave early.     Patient will continue to benefit from skilled PT services to address functional mobility deficits, address ROM deficits, address strength deficits, analyze and address soft tissue restrictions, analyze and cue movement patterns, analyze and modify body mechanics/ergonomics, assess and modify postural abnormalities and instruct in home and community integration to attain remaining goals. [x]  See Plan of Care  []  See progress note/recertification  []  See Discharge Summary         Progress towards goals / Updated goals:  1 patient will have established and be I with HEP to aid with self management at discharge               EVAL issued HEP               CURRENT  1x day - progressing  8/9/21  2 patient will have pain 2-3/10 to aid with increase tolerance to swimming               EVAL 3-4 and pain worse with initial 10 minutes of swimming               CURRENT 1-2 at arrival - tightness 8/9/21  Long Term Goals: To be accomplished in 8 treatments:  1 patient will have FOTO improved to 76 to show projected gains with increase tolerance to function at home and with recreation               EVAL 63               CURRENT will assess at next re-eval - progressing  8/4/21   2 patient will have abduction left shoulder 165 to aid with increase tolerance to swimming and weight lifting               EVAL 146, tight               EYNGMFV pt demo ~ 165 with wall slides 8/11/21  3 patient will have overall improvement at 75% to aid with resuming all bench pressing and flies with less pain               EVAL grinding with flies and popping with bench press causing overall increase pain left shoulder               CURRENT patient with symptom improvement based on drop in pain rating from 3-4/10 to 1-2/10 today, has not initiated bench press since beginning PT - progressing  8/9/21  4.  Patient will have 8-10# overhead reach 5-10X with no increase pain for carryover to household activities               EVAL per FOTO some difficulty               CURRENT not initiated - progressing 8/9/21    PLAN  []  Upgrade activities as tolerated     [x]  Continue plan of care  []  Update interventions per flow sheet       []  Discharge due to:_  [x]  Other:_  REASSESS GOALS MD NOTE LIOR Taylor PTA 8/16/2021  9:12 AM    Future Appointments   Date Time Provider Viki Abby   8/18/2021  9:00 AM Tootie Damon PTA [facility]   9/14/2021  7:55 AM [facility] NURSE VISIT [facility]   9/28/2021  3:45 PM Freddie Saldaña MD [facility] AMB

## 2021-08-18 ENCOUNTER — HOSPITAL ENCOUNTER (OUTPATIENT)
Dept: PHYSICAL THERAPY | Age: 51
Discharge: HOME | End: 2021-08-18
Payer: COMMERCIAL

## 2021-08-18 PROCEDURE — 97530 THERAPEUTIC ACTIVITIES: CPT

## 2021-08-18 PROCEDURE — 97110 THERAPEUTIC EXERCISES: CPT

## 2021-08-18 NOTE — PROGRESS NOTES
[facility] [address], [address] [place] [address], [address] [place], [place] [address]y [address] [phone] fax    Physician Update  [x] Progress Note  [] Discharge Summary  Patient name: Fran Vivas Start of Care: 2021   Referral source: Elroy Bingham MD : 1970   Medical/Treatment Diagnosis: Pain in left shoulder [M25.512]  Payor: Allyson De La Cruz / Plan: [place] [employer] P[place] / Product Type: PPO /  Onset Date:March 3-2021                  Prior Hospitalization: see medical history Provider#: [id number]   Medications: Verified on Patient Summary List    Comorbidities: asthma  Prior Level of Function: I all areas of ADLS and activities, no AD, working, doing things at home and in the community, weightlifting, , running, hiking, biking and outdoor activitie    Visits from [place] of Care: 8    Missed Visits: 8    Status at Evaluation/Last Progress Note: see eval  Progress towards Goals:  Mr. Willam Ruiz reports 70% imprpovement. On average pt reports 1/10 pain. Pt mainly c/o popping and cracking at left shoulder. FOTO 61   AROM left shoulder ABD is full AROM. Patient will continue to benefit from skilled PT services to address functional mobility deficits, address ROM deficits, address strength deficits, analyze and address soft tissue restrictions, analyze and cue movement patterns, analyze and modify body mechanics/ergonomics, assess and modify postural abnormalities and instruct in home and community integration to attain remaining goals. Goals: to be achieved in 4 weeks:  1 patient will have FOTO improved to 76 to show projected gains with increase tolerance to function at home and with recreation     PN  61  2.patient will have overall improvement at 75% to aid with resuming all bench pressing and flies with less pain    PN patient tried the flies and felt friction/popping but no pain   3.  Patient will have 8-10# overhead reach 5-10X with no increase pain for carryover to household activities    PN  8#   5x   1/10 pain    ASSESSMENT/RECOMMENDATIONS:  [x]Continue therapy per initial plan/protocol at a frequency of  2 x per week for 4 weeks  []Continue therapy with the following recommended changes:_____________________      _____________________________________________________________________  []Discontinue therapy progressing towards or have reached established goals  []Discontinue therapy due to lack of appreciable progress towards goals  []Discontinue therapy due to lack of attendance or compliance  []Await Physician's recommendations/decisions regarding therapy  []Other:________________________________________________________________    Thank you for this referral. Lexie Ruiz, PTA 8/18/2021 9:55 AM  NOTE TO PHYSICIAN:  Janeth Patel 172   FAX TO [facility]: [phone]  If you are unable to process this request in 24 hours please contact our office: [phone]    ? I have read the above report and request that my patient continue as recommended. ? I have read the above report and request that my patient continue therapy with the following changes/special instructions:_____________________________________  ? I have read the above report and request that my patient be discharged from therapy.     Physician's Signature:____________Date:_________TIME:________     Kyle Mann MD  ** Signature, Date and Time must be completed for valid certification **

## 2021-08-18 NOTE — PROGRESS NOTES
PT DAILY TREATMENT NOTE     Patient Name: Jamshid Villar  DBBL:  : 1970  [x]  Patient  Verified  Payor: Junior Hutchison / Plan: [place] [employer] P[place] / Product Type: PPO /    In time:9:11  Out time:9:50  Total Treatment Time (min): 39  Visit #: 8 of 8  Medicare/BCBS Only   Total Timed Codes (min):   1:1 Treatment Time:        Treatment Area: Pain in left shoulder [M25.512]    SUBJECTIVE  Pain Level (0-10 scale): 1  Any medication changes, allergies to medications, adverse drug reactions, diagnosis change, or new procedure performed?: [x] No    [] Yes (see summary sheet for update)  Subjective functional status/changes:   [] No changes reported  \"very little pain. \"  OBJECTIVE    Modality rationale: decrease edema, decrease inflammation, decrease pain, increase tissue extensibility and increase muscle contraction/control to improve the patients ability to perform ADL    Min Type Additional Details    [] Estim:  []Unatt       []IFC  []Premod                        []Other:  []w/ice   []w/heat  Position:  Location:    [] Estim: []Att    []TENS instruct  []NMES                    []Other:  []w/US   []w/ice   []w/heat  Position:  Location:    []  Traction: [] Cervical       []Lumbar                       [] Prone          []Supine                       []Intermittent   []Continuous Lbs:  [] before manual  [] after manual    []  Ultrasound: []Continuous   [] Pulsed                           []1MHz   []3MHz W/cm2:  Location:    []  Iontophoresis with dexamethasone         Location: [] Take home patch   [] In clinic    []  Ice     []  heat  []  Ice massage  []  Laser   []  Anodyne Position:  Location:    []  Laser with stim  []  Other:  Position:  Location:    []  Vasopneumatic Device    []  Right     []  Left  Pre-treatment girth:  Post-treatment girth:  Measured at (location):  Pressure:       [] lo [] med [] hi   Temperature: [] lo [] med [] hi   [x] Skin assessment post-treatment:  [x]intact []redness- no adverse reaction    []redness - adverse reaction:      min []Eval                  []Re-Eval       25 min Therapeutic Exercise:  [x] See flow sheet :   Rationale: increase ROM, increase strength and improve coordination to improve the patients ability to perform ADL     14 min Therapeutic Activity:  [x]  See flow sheet :   Rationale: increase ROM, increase strength and improve coordination  to improve the patients ability to perform ADL      min Neuromuscular Re-education:  []  See flow sheet :   Rationale: increase ROM, increase strength, improve coordination, improve balance and increase proprioception  to improve the patients ability to perform ADL      min Manual Therapy: The manual therapy interventions were performed at a separate and distinct time from the therapeutic activities interventions. Rationale: decrease pain, increase ROM, increase tissue extensibility, decrease edema , decrease trigger points and increase postural awareness to perform ADL      min Gait Training:  ___ feet with ___ device on level surfaces with ___ level of assist   Rationale: With   [x] TE   [x] TA   [] neuro   [] other: Patient Education: [x] Review HEP    [] Progressed/Changed HEP based on:   [] positioning   [] body mechanics   [] transfers   [] heat/ice application    [x] other: REASSESS GOALS     Other Objective/Functional Measures:  FOTO 61 Left  Shoulder  ABD   Full AROM  Pain Level (0-10 scale) post treatment: 0    ASSESSMENT/Changes in Function: Mr. Marybel Campoverde reports 70% imprpovement. On average pt reports 1/10 pain. Pt mainly c/o popping and cracking at left shoulder.     Patient will continue to benefit from skilled PT services to address functional mobility deficits, address ROM deficits, address strength deficits, analyze and address soft tissue restrictions, analyze and cue movement patterns, analyze and modify body mechanics/ergonomics, assess and modify postural abnormalities and instruct in home and community integration to attain remaining goals. []  See Plan of Care  [x]  See progress note/recertification  []  See Discharge Summary         Progress towards goals / Updated goals:  1 patient will have established and be I with HEP to aid with self management at discharge               EVAL issued HEP               CURRENT  1x day - progressing  8/18/21  2 patient will have pain 2-3/10 to aid with increase tolerance to swimming               EVAL 3-4 and pain worse with initial 10 minutes of swimming               CURRENT 1-2  - tightness  At the beginning but as pt continues it loosens 8/18/21  Long Term Goals: To be accomplished in 8 treatments:  1 patient will have FOTO improved to 76 to show projected gains with increase tolerance to function at home and with recreation               EVAL 63               CURRENT 61- progressing  8/4/21   2 patient will have abduction left shoulder 165 to aid with increase tolerance to swimming and weight lifting               EVAL 146, tight               CURRENT met full ROM 8/18/21  3 patient will have overall improvement at 75% to aid with resuming all bench pressing and flies with less pain               EVAL grinding with flies and popping with bench press causing overall increase pain left shoulder               CURRENT patient tried the flies and felt friction/popping but no pain 8/18   4.  Patient will have 8-10# overhead reach 5-10X with no increase pain for carryover to household activities               EVAL per FOTO some difficulty               CURRENT 8#   5x   1/10 pain  - progressing 8/18/21  PLAN  []  Upgrade activities as tolerated     []  Continue plan of care  []  Update interventions per flow sheet       []  Discharge due to:_  []  Other:_  FAX MD NOTE    Gordon Campos PTA 8/18/2021  9:15 AM    Future Appointments   Date Time Provider Viki Mora   9/14/2021  7:55 AM [facility] NURSE VISIT [facility] BS AMB   9/28/2021  3:45 PM Horacio Saldaña MD [facility] BS AMB

## 2021-09-07 NOTE — PROGRESS NOTES
[facility] NANCY MAGANA [facility]  [address] [facility], [address] [place] [address], [address]  [phone] fax    Physician Update  [] Progress Note  [x] Discharge Summary  Patient name: Nazario Hernandez Start of Care: 21   Referral source: Linda Lynch MD : 1970   Medical/Treatment Diagnosis: Pain in left shoulder [M25.512]  Payor: Vitor Dolan / Plan: [place] [employer] PPO / Product Type: PPO /  Onset Date:March 3-2021     Prior Hospitalization: see medical history Provider#: [id number]   Medications: Verified on Patient Summary List    Comorbidities: asthma  Prior Level of Function: I all areas of ADLS and activities, no AD, working, doing things at home and in the community, weightlifting, , running, hiking, biking and outdoor activities    Visits from Start of Care: 8    Missed Visits: 0    Status at Evaluation/Last Progress Note:   Mr. Macedo Coil reports 70% imprpovement. On average pt reports 1/10 pain. Pt mainly c/o popping and cracking at left shoulder. FOTO 61   AROM left shoulder ABD is full AROM. Patient will continue to benefit from skilled PT services to address functional mobility deficits, address ROM deficits, address strength deficits, analyze and address soft tissue restrictions, analyze and cue movement patterns, analyze and modify body mechanics/ergonomics, assess and modify postural abnormalities and instruct in home and community integration to attain remaining goals.     Progress towards Goals:  1 patient will have established and be I with HEP to aid with self management at discharge               EVAL issued HEP               CURRENT  1x day - progressing  21  2 patient will have pain 2-3/10 to aid with increase tolerance to swimming               EVAL 3-4 and pain worse with initial 10 minutes of swimming               CURRENT 1-2  - tightness at the beginning but as pt continues it loosens - progressing 21  Long Term Goals: To be accomplished in 8 treatments:  1 patient will have FOTO improved to 76 to show projected gains with increase tolerance to function at home and with recreation               EVAL 63               CURRENT 61- progressing  8/4/21   2 patient will have abduction left shoulder 165 to aid with increase tolerance to swimming and weight lifting               EVAL 146, tight               CURRENT met full ROM 8/18/21   3 patient will have overall improvement at 75% to aid with resuming all bench pressing and flies with less pain               EVAL grinding with flies and popping with bench press causing overall increase pain left shoulder               CURRENT patient tried the flies and felt friction/popping but no pain - progressing 8/18/21  4. Patient will have 8-10# overhead reach 5-10X with no increase pain for carryover to household activities               EVAL per FOTO some difficulty               CURRENT performing with 8# 5x, 1/10 pain  - progressing 8/18/21    ASSESSMENT/RECOMMENDATIONS:  []Continue therapy per initial plan/protocol at a frequency of  0 x per week for 0 weeks  []Continue therapy with the following recommended changes:_____________________      _____________________________________________________________________  []Discontinue therapy progressing towards or have reached established goals  []Discontinue therapy due to lack of appreciable progress towards goals  []Discontinue therapy due to lack of attendance or compliance  []Await Physician's recommendations/decisions regarding therapy  [x]Other: Discontinue therapy due to insurance/authorization expiration     Thank you for this referral. Tegan Gonzalez, PT 9/7/2021 6:16 PM  NOTE TO PHYSICIAN:  [address] AND   FAX TO [facility]: [phone]  If you are unable to process this request in 24 hours please contact our office: [phone]    ?  I have read the above report and request that my patient continue as recommended. ? I have read the above report and request that my patient continue therapy with the following changes/special instructions:_____________________________________  ? I have read the above report and request that my patient be discharged from therapy.     Physician's Signature:____________Date:_________TIME:________     Mariya Pate MD  ** Signature, Date and Time must be completed for valid certification **

## 2021-09-19 DIAGNOSIS — Z00.00 ROUTINE MEDICAL EXAM: ICD-10-CM

## 2021-12-20 RX ORDER — SILDENAFIL 100 MG/1
100 TABLET, FILM COATED ORAL
Qty: 10 TABLET | Refills: 5 | Status: SHIPPED | OUTPATIENT
Start: 2021-12-20 | End: 2022-10-10 | Stop reason: SDUPTHER

## 2021-12-20 NOTE — TELEPHONE ENCOUNTER
----- Message from Ita Orozco sent at 12/20/2021 11:33 AM EST -----  Subject: Message to Provider    QUESTIONS  Information for Provider? Brynn would like to  a written   prescription for sildenafil citrate (Viagra) 100 mg tablet. Please let   Brynn know he can stop by and pick it up.   ---------------------------------------------------------------------------  --------------  CALL BACK INFO  What is the best way for the office to contact you? OK to leave message on   voicemail  Preferred Call Back Phone Number? [phone]  ---------------------------------------------------------------------------  --------------  SCRIPT ANSWERS  Relationship to Patient?  Self

## 2022-01-03 ENCOUNTER — VIRTUAL VISIT (OUTPATIENT)
Dept: INTERNAL MEDICINE CLINIC | Age: 52
End: 2022-01-03
Payer: COMMERCIAL

## 2022-01-03 DIAGNOSIS — U07.1 COVID-19 VIRUS INFECTION: Primary | ICD-10-CM

## 2022-01-03 PROCEDURE — 99213 OFFICE O/P EST LOW 20 MIN: CPT | Performed by: INTERNAL MEDICINE

## 2022-01-04 NOTE — PROGRESS NOTES
Ping Hester is a 54 y.o. male who was seen by synchronous (real-time) audio-video technology on 1/3/2022 for Other ( tested positive for covid)        Assessment & Plan:   Diagnoses and all orders for this visit:    1. COVID-19 virus infection  Comments:  Probable exposure 8 d ago, syxs x6 d, outside of range for oral treatment. Obtain pulse ox, check tid & PRN, seek care ASAP if IQBAL, sat <94%        I spent at least 20 minutes on this visit with this established patient. 712  Subjective:     Ping Hester is a 73-year-old male who tested positive for COVID-19 4 days ago. He believes his exposure was 8 days ago, when they saw an old friend of his. The friend called 2 days later to say that he had tested positive for Covid. Mr. Leesa Avila started having symptoms that day, 6 days ago, and tested positive for Covid that day. He does have asthma, which sounds well controlled. He has never had any hospitalizations for his asthma. He exercises vigorously on a daily basis, and uses his rescue inhaler prophylactically before exercise. He has not recently had to use it as a \"rescue\", says this occurs about once a year due to allergies or upper respiratory infections. When he became ill, he took an leftover Z-Jack, has taken 3 days of this. I advised him that this will not treat a viral infection, and to stop it. Prior to Admission medications    Medication Sig Start Date End Date Taking? Authorizing Provider   sildenafil citrate (Viagra) 100 mg tablet Take 1 Tablet by mouth daily as needed (ED). 12/20/21  Yes Bernie Saldaña MD   fexofenadine HCl (ALLEGRA PO) Take  by mouth. Yes Provider, Historical   multivitamin (ONE A DAY) tablet Take 1 Tab by mouth daily.    Yes Provider, Historical   OTHER Allergy injections/Dr Juan Carlos Reid Provider, Historical   VENTOLIN HFA 90 mcg/actuation inhaler  7/2/15  Yes Provider, Historical   montelukast (SINGULAIR) 10 mg tablet  7/2/15  Yes Provider, Historical fluticasone propionate (FLOVENT HFA) 220 mcg/actuation inhaler Take 2 Puffs by inhalation two (2) times a day. Patient not taking: Reported on 7/16/2021 3/6/20   Mis Sterling MD   mometasone (NASONEX) 50 mcg/actuation nasal spray 2 Sprays by Both Nostrils route daily. Patient not taking: Reported on 7/16/2021 2/28/18   Sajan Burns NP   fexofenadine-pseudoephedrine (ALLEGRA-D)  mg Tb12 Take 1 Tab by mouth every twelve (12) hours. Patient not taking: Reported on 7/16/2021    Provider, Historical     Past Medical History:   Diagnosis Date    Asthma     Environmental allergies     H/O: pneumonia        Review of Systems   Constitutional: Positive for chills. HENT: Positive for sore throat. No loss of taste, no loss of smell. Eyes: Negative for blurred vision. Respiratory: Negative for shortness of breath. Minimal cough. Cardiovascular: Negative for chest pain. Gastrointestinal: Negative for diarrhea, nausea and vomiting. Skin: Negative for rash. Neurological: Positive for headaches. Objective:   No flowsheet data found. General: alert, cooperative, no distress   Mental  status: normal mood, behavior, speech, dress, motor activity, and thought processes, able to follow commands   HENT: NCAT   Neck: no visualized mass   Resp: no respiratory distress   Neuro: no gross deficits   Skin: no discoloration or lesions of concern on visible areas   Psychiatric: normal affect, consistent with stated mood, no evidence of hallucinations     Additional exam findings:     Nontoxic, speaks full sentences easily. We discussed the expected course, resolution and complications of the diagnosis(es) in detail. Medication risks, benefits, costs, interactions, and alternatives were discussed as indicated. I advised him to contact the office if his condition worsens, changes or fails to improve as anticipated. He expressed understanding with the diagnosis(es) and plan.      Brynn Casey, was evaluated through a synchronous (real-time) audio-video encounter. The patient (or guardian if applicable) is aware that this is a billable service. Verbal consent to proceed has been obtained within the past 12 months. The visit was conducted pursuant to the emergency declaration under the [address] [place] [address] authority and the [employer] and [employer] General Act. Patient identification was verified, and a caregiver was present when appropriate. The patient was located in a state where the provider was credentialed to provide care.     Salty Galvan MD

## 2022-05-24 ENCOUNTER — CLINICAL SUPPORT (OUTPATIENT)
Dept: INTERNAL MEDICINE CLINIC | Age: 52
End: 2022-05-24
Payer: COMMERCIAL

## 2022-05-24 DIAGNOSIS — Z11.1 SCREENING-PULMONARY TB: Primary | ICD-10-CM

## 2022-05-24 PROCEDURE — 86580 TB INTRADERMAL TEST: CPT | Performed by: INTERNAL MEDICINE

## 2022-05-24 NOTE — PROGRESS NOTES
PPD Placement note  Gris Barillas, 54 y.o. male is here today for placement of PPD test  Reason for PPD test: WORK/new job  Pt taken PPD test before: yes  Verified in allergy area and with patient that they are not allergic to the products PPD is made of (Phenol or Tween). Yes  Is patient taking any oral or IV steroid medication now or have they taken it in the last month? no  Has the patient ever received the BCG vaccine?: no  Has the patient been in recent contact with anyone known or suspected of having active TB disease?: no       O: Alert and oriented in NAD. P:  PPD placed on 5/24/2022 in left forearm  Patient advised to return for reading within 48-72 hours.

## 2022-05-27 ENCOUNTER — CLINICAL SUPPORT (OUTPATIENT)
Dept: INTERNAL MEDICINE CLINIC | Age: 52
End: 2022-05-27

## 2022-05-27 DIAGNOSIS — Z11.1 ENCOUNTER FOR PPD SKIN TEST READING: Primary | ICD-10-CM

## 2022-05-27 NOTE — PROGRESS NOTES
Nazario Hernandez presents with   Chief Complaint   Patient presents with    PPD Reading     neg       Patient had no induration on L arm where PPD was placed.

## 2022-09-01 ENCOUNTER — TELEPHONE (OUTPATIENT)
Dept: INTERNAL MEDICINE CLINIC | Age: 52
End: 2022-09-01

## 2022-09-01 DIAGNOSIS — Z00.00 ROUTINE MEDICAL EXAM: Primary | ICD-10-CM

## 2022-09-01 NOTE — TELEPHONE ENCOUNTER
Pt notified labs ordered. He will get about a week before offsite. He will print labs from his my chart.

## 2022-09-01 NOTE — TELEPHONE ENCOUNTER
Patient requesting lab orders for upcoming appt in October. Please route to front office to contact patient once ordered.   Thank you

## 2022-09-01 NOTE — TELEPHONE ENCOUNTER
----- Message from Sharmaine Montez sent at 9/1/2022  8:58 AM EDT -----  Subject: Referral Request    Reason for referral request? yearly labs: upcoming appointment on 10/10  Provider patient wants to be referred to(if known):     Provider Phone Number(if known):     Additional Information for Provider?   ---------------------------------------------------------------------------  --------------  [address]    [phone]; OK to leave message on voicemail  ---------------------------------------------------------------------------  --------------

## 2022-10-01 ENCOUNTER — HOSPITAL ENCOUNTER (OUTPATIENT)
Dept: LAB | Age: 52
Discharge: HOME | End: 2022-10-01

## 2022-10-01 DIAGNOSIS — Z00.00 ROUTINE MEDICAL EXAM: ICD-10-CM

## 2022-10-01 LAB — XX-LABCORP SPECIMEN COL,LCBCF: NORMAL

## 2022-10-01 PROCEDURE — 99001 SPECIMEN HANDLING PT-LAB: CPT

## 2022-10-02 LAB
ALBUMIN SERPL-MCNC: 4.5 G/DL (ref 4–5)
ALBUMIN/GLOB SERPL: 2 {RATIO} (ref 1.2–2.2)
ALP SERPL-CCNC: 49 IU/L (ref 44–121)
ALT SERPL-CCNC: 13 IU/L (ref 0–44)
AST SERPL-CCNC: 23 IU/L (ref 0–40)
BASOPHILS # BLD AUTO: 0.1 X10E3/UL (ref 0–0.2)
BASOPHILS NFR BLD AUTO: 1 %
BILIRUB SERPL-MCNC: 0.4 MG/DL (ref 0–1.2)
BUN SERPL-MCNC: 16 MG/DL (ref 6–24)
BUN/CREAT SERPL: 13 (ref 9–20)
CALCIUM SERPL-MCNC: 9.6 MG/DL (ref 8.7–10.2)
CHLORIDE SERPL-SCNC: 102 MMOL/L (ref 96–106)
CHOLEST SERPL-MCNC: 197 MG/DL (ref 100–199)
CO2 SERPL-SCNC: 25 MMOL/L (ref 20–29)
CREAT SERPL-MCNC: 1.27 MG/DL (ref 0.76–1.27)
EGFR: 69 ML/MIN/1.73
EOSINOPHIL # BLD AUTO: 0.1 X10E3/UL (ref 0–0.4)
EOSINOPHIL NFR BLD AUTO: 2 %
ERYTHROCYTE [DISTWIDTH] IN BLOOD BY AUTOMATED COUNT: 11.9 % (ref 11.6–15.4)
GLOBULIN SER CALC-MCNC: 2.2 G/DL (ref 1.5–4.5)
GLUCOSE SERPL-MCNC: 64 MG/DL (ref 70–99)
HCT VFR BLD AUTO: 42 % (ref 37.5–51)
HDLC SERPL-MCNC: 80 MG/DL
HGB BLD-MCNC: 14.4 G/DL (ref 13–17.7)
IMM GRANULOCYTES # BLD AUTO: 0 X10E3/UL (ref 0–0.1)
IMM GRANULOCYTES NFR BLD AUTO: 0 %
IMP & REVIEW OF LAB RESULTS: NORMAL
LDLC SERPL CALC-MCNC: 107 MG/DL (ref 0–99)
LYMPHOCYTES # BLD AUTO: 1.9 X10E3/UL (ref 0.7–3.1)
LYMPHOCYTES NFR BLD AUTO: 38 %
MCH RBC QN AUTO: 30.3 PG (ref 26.6–33)
MCHC RBC AUTO-ENTMCNC: 34.3 G/DL (ref 31.5–35.7)
MCV RBC AUTO: 88 FL (ref 79–97)
MONOCYTES # BLD AUTO: 0.4 X10E3/UL (ref 0.1–0.9)
MONOCYTES NFR BLD AUTO: 8 %
NEUTROPHILS # BLD AUTO: 2.5 X10E3/UL (ref 1.4–7)
NEUTROPHILS NFR BLD AUTO: 51 %
PLATELET # BLD AUTO: 240 X10E3/UL (ref 150–450)
POTASSIUM SERPL-SCNC: 4.7 MMOL/L (ref 3.5–5.2)
PROT SERPL-MCNC: 6.7 G/DL (ref 6–8.5)
PSA SERPL-MCNC: 0.4 NG/ML (ref 0–4)
RBC # BLD AUTO: 4.75 X10E6/UL (ref 4.14–5.8)
SODIUM SERPL-SCNC: 142 MMOL/L (ref 134–144)
SPECIMEN STATUS REPORT, ROLRST: NORMAL
TRIGL SERPL-MCNC: 55 MG/DL (ref 0–149)
VLDLC SERPL CALC-MCNC: 10 MG/DL (ref 5–40)
WBC # BLD AUTO: 4.9 X10E3/UL (ref 3.4–10.8)

## 2022-10-05 NOTE — PROGRESS NOTES
HISTORY OF PRESENT ILLNESS  Josette Edmondson is a 41 y.o. male. Patient presents today with on-going GI problems. HPI  Pt states he stopped the Cipro after 6 days due to side effects. He thinks it did help. He did change his diet which has helped and he has been drinking more water. He does not after eating about 10 minutes, he gets the stomach pains and the feeling he has to go but he pushed through it and it resolves. Review of Systems   Constitutional: Negative. HENT: Negative. Respiratory: Negative. Cardiovascular: Negative. Gastrointestinal: Positive for abdominal pain (10 minutes after eating) and diarrhea (10 minutes after eating.). Negative for blood in stool, constipation, melena, nausea and vomiting. Genitourinary: Negative. Visit Vitals    /76    Pulse 63    Temp 98 °F (36.7 °C)    Resp 18    Ht 5' 6\" (1.676 m)    Wt 220 lb (99.8 kg)    SpO2 97%    BMI 35.51 kg/m2       Physical Exam   Constitutional: He appears well-developed. No distress. Cardiovascular: Normal rate, regular rhythm and normal heart sounds. Pulmonary/Chest: Effort normal and breath sounds normal. No respiratory distress. He has no wheezes. He has no rales. Abdominal: There is no CVA tenderness. ASSESSMENT and PLAN    ICD-10-CM ICD-9-CM    1. Generalized abdominal pain R10.84 789.07 CT ABD PELV W WO CONT     PLAN:  Pt is to continue pushing fluids. Pt is to continue current diet. Pt is to call with any concerns. Pt given after visit summary. Advancement Flap (Double) Text: The defect edges were debeveled with a #15 scalpel blade.  Given the location of the defect and the proximity to free margins a double advancement flap was deemed most appropriate.  Using a sterile surgical marker, the appropriate advancement flaps were drawn incorporating the defect and placing the expected incisions within the relaxed skin tension lines where possible.    The area thus outlined was incised deep to adipose tissue with a #15 scalpel blade.  The skin margins were undermined to an appropriate distance in all directions utilizing iris scissors.

## 2022-10-10 ENCOUNTER — OFFICE VISIT (OUTPATIENT)
Dept: INTERNAL MEDICINE CLINIC | Age: 52
End: 2022-10-10
Payer: COMMERCIAL

## 2022-10-10 VITALS
BODY MASS INDEX: 31.18 KG/M2 | HEIGHT: 66 IN | TEMPERATURE: 97.1 F | SYSTOLIC BLOOD PRESSURE: 125 MMHG | HEART RATE: 56 BPM | DIASTOLIC BLOOD PRESSURE: 69 MMHG | WEIGHT: 194 LBS | OXYGEN SATURATION: 98 % | RESPIRATION RATE: 20 BRPM

## 2022-10-10 DIAGNOSIS — R05.9 COUGH, UNSPECIFIED TYPE: ICD-10-CM

## 2022-10-10 DIAGNOSIS — Z23 ENCOUNTER FOR IMMUNIZATION: ICD-10-CM

## 2022-10-10 DIAGNOSIS — Z00.00 PHYSICAL EXAM: Primary | ICD-10-CM

## 2022-10-10 DIAGNOSIS — J45.20 MILD INTERMITTENT REACTIVE AIRWAY DISEASE WITHOUT COMPLICATION: ICD-10-CM

## 2022-10-10 PROCEDURE — 90471 IMMUNIZATION ADMIN: CPT | Performed by: INTERNAL MEDICINE

## 2022-10-10 PROCEDURE — 90686 IIV4 VACC NO PRSV 0.5 ML IM: CPT | Performed by: INTERNAL MEDICINE

## 2022-10-10 PROCEDURE — 99396 PREV VISIT EST AGE 40-64: CPT | Performed by: INTERNAL MEDICINE

## 2022-10-10 RX ORDER — SILDENAFIL 100 MG/1
100 TABLET, FILM COATED ORAL
Qty: 10 TABLET | Refills: 5 | Status: SHIPPED | OUTPATIENT
Start: 2022-10-10

## 2022-10-10 RX ORDER — CODEINE PHOSPHATE AND GUAIFENESIN 10; 100 MG/5ML; MG/5ML
10 SOLUTION ORAL
Qty: 120 ML | Refills: 0 | Status: SHIPPED | OUTPATIENT
Start: 2022-10-10 | End: 2022-10-17

## 2022-10-10 RX ORDER — FLUTICASONE PROPIONATE 220 UG/1
2 AEROSOL, METERED RESPIRATORY (INHALATION) 2 TIMES DAILY
Qty: 1 EACH | Refills: 1 | Status: SHIPPED | OUTPATIENT
Start: 2022-10-10

## 2022-10-10 RX ORDER — ALBUTEROL SULFATE 90 UG/1
1 AEROSOL, METERED RESPIRATORY (INHALATION)
Qty: 18 G | Refills: 5 | Status: SHIPPED | OUTPATIENT
Start: 2022-10-10

## 2022-10-10 RX ORDER — MONTELUKAST SODIUM 10 MG/1
10 TABLET ORAL DAILY
Qty: 90 TABLET | Refills: 3 | Status: SHIPPED | OUTPATIENT
Start: 2022-10-10

## 2022-10-10 NOTE — PROGRESS NOTES
Patient is in the office today for a physical.       1. \"Have you been to the ER, urgent care clinic since your last visit? Hospitalized since your last visit? \" No    2. \"Have you seen or consulted any other health care providers outside of the [address] [place] [address] since your last visit? \" No     3. For patients aged 39-70: Has the patient had a colonoscopy / FIT/ Cologuard? Yes - no Care Gap present      If the patient is female:    4. For patients aged 41-77: Has the patient had a mammogram within the past 2 years? NA - based on age or sex      11. For patients aged 21-65: Has the patient had a pap smear? NA - based on age or sex    Verbal order read back per Dr. Rigoberto Bond flu vaccine. Patient received flu vaccine in right deltoid. Patient was observed and no signs or symptoms of an allergic reaction noted at this time. Patient tolerated well and left without complaints. Patient received flu VIS.

## 2022-10-10 NOTE — PATIENT INSTRUCTIONS
Vaccine Information Statement    Influenza (Flu) Vaccine (Inactivated or Recombinant): What You Need to Know    Many vaccine information statements are available in Turkish and other languages. See www.immunize.org/vis. Hojas de información sobre vacunas están disponibles en español y en muchos otros idiomas. Visite www.immunize.org/vis. 1. Why get vaccinated? Influenza vaccine can prevent influenza (flu). Flu is a contagious disease that spreads around the [facility] every year, usually between October and May. Anyone can get the flu, but it is more dangerous for some people. Infants and young children, people 72 years and older, pregnant people, and people with certain health conditions or a weakened immune system are at greatest risk of flu complications. Pneumonia, bronchitis, sinus infections, and ear infections are examples of flu-related complications. If you have a medical condition, such as heart disease, cancer, or diabetes, flu can make it worse. Flu can cause fever and chills, sore throat, muscle aches, fatigue, cough, headache, and runny or stuffy nose. Some people may have vomiting and diarrhea, though this is more common in children than adults. In an average year, thousands of people in the [facility] die from flu, and many more are hospitalized. Flu vaccine prevents millions of illnesses and flu-related visits to the doctor each year. 2. Influenza vaccines     CDC recommends everyone 6 months and older get vaccinated every flu season. Children 6 months through 6years of age may need 2 doses during a single flu season. Everyone else needs only 1 dose each flu season. It takes about 2 weeks for protection to develop after vaccination. There are many flu viruses, and they are always changing. Each year a new flu vaccine is made to protect against the influenza viruses believed to be likely to cause disease in the upcoming flu season.  Even when the vaccine doesnt exactly match these viruses, it may still provide some protection. Influenza vaccine does not cause flu. Influenza vaccine may be given at the same time as other vaccines. 3. Talk with your health care provider    Tell your vaccination provider if the person getting the vaccine:  Has had an allergic reaction after a previous dose of influenza vaccine, or has any severe, life-threatening allergies   Has ever had Guillain-Barré Syndrome (also called GBS)    In some cases, your health care provider may decide to postpone influenza vaccination until a future visit. Influenza vaccine can be administered at any time during pregnancy. People who are or will be pregnant during influenza season should receive inactivated influenza vaccine. People with minor illnesses, such as a cold, may be vaccinated. People who are moderately or severely ill should usually wait until they recover before getting influenza vaccine. Your health care provider can give you more information. 4. Risks of a vaccine reaction    Soreness, redness, and swelling where the shot is given, fever, muscle aches, and headache can happen after influenza vaccination. There may be a very small increased risk of Guillain-Barré Syndrome (GBS) after inactivated influenza vaccine (the flu shot). Be Carballo children who get the flu shot along with pneumococcal vaccine (PCV13) and/or DTaP vaccine at the same time might be slightly more likely to have a seizure caused by fever. Tell your health care provider if a child who is getting flu vaccine has ever had a seizure. People sometimes faint after medical procedures, including vaccination. Tell your provider if you feel dizzy or have vision changes or ringing in the ears. As with any medicine, there is a very remote chance of a vaccine causing a severe allergic reaction, other serious injury, or death. 5. What if there is a serious problem?     An allergic reaction could occur after the vaccinated person leaves the clinic. If you see signs of a severe allergic reaction (hives, swelling of the face and throat, difficulty breathing, a fast heartbeat, dizziness, or weakness), call 9-1-1 and get the person to the nearest hospital.    For other signs that concern you, call your health care provider. Adverse reactions should be reported to the Vaccine Adverse Event Reporting System (VAERS). Your health care provider will usually file this report, or you can do it yourself. Visit the VAERS website at www.vaers. [facility].gov or call 7-609.125.7149. VAERS is only for reporting reactions, and VAERS staff members do not give medical advice. 6. The National Vaccine Injury Compensation Program    The [facility] Vaccine Injury Compensation Program (VICP) is a federal program that was created to compensate people who may have been injured by certain vaccines. Claims regarding alleged injury or death due to vaccination have a time limit for filing, which may be as short as two years. Visit the VICP website at www.[facility].gov/vaccinecompensation or call 7-262.953.2557 to learn about the program and about filing a claim. 7. How can I learn more? Ask your health care provider. Call your local or state health department. Visit the website of the Food and Drug Administration (FDA) for vaccine package inserts and additional information at www.fda.gov/vaccines-blood-biologics/vaccines. Contact the Centers for Disease Control and Prevention (CDC): Call 8-261.772.6259 (1-800-CDC-INFO) or  Visit CDCs influenza website at www.cdc.gov/flu. Vaccine Information Statement   Inactivated Influenza Vaccine   8/6/2021  42 RONALDO Sheridan [id number]   Department of Health and Human Services  Centers for Disease Control and Prevention    Office Use Only

## 2022-10-14 NOTE — PROGRESS NOTES
Subjective:   Jeremiah Shane is a 50 y.o. male presenting for his annual checkup. ROS:  Feeling well. No dyspnea or chest pain on exertion. No abdominal pain, change in bowel habits, black or bloody stools. No urinary tract or prostatic symptoms. No neurological complaints. Specific concerns today: Patient is working on trying to increase his exercise so he can lose weight. .  Patient has asthma that is controlled with Flovent. His allergies are controlled with Singulair and Nasonex as well as Allegra as needed. Patient Active Problem List   Diagnosis Code    H/O: pneumonia Z87.01    Asthma J45.909    Sinus bradycardia on ECG R00.1    Gastritis K29.70     Current Outpatient Medications   Medication Sig Dispense Refill    montelukast (SINGULAIR) 10 mg tablet Take 1 Tablet by mouth daily. 90 Tablet 3    fluticasone propionate (FLOVENT HFA) 220 mcg/actuation inhaler Take 2 Puffs by inhalation two (2) times a day. 1 Each 1    albuterol (Ventolin HFA) 90 mcg/actuation inhaler Take 1 Puff by inhalation every six (6) hours as needed for Wheezing. 18 g 5    sildenafil citrate (Viagra) 100 mg tablet Take 1 Tablet by mouth daily as needed (ED). 10 Tablet 5    guaiFENesin-codeine (Cheratussin AC) 100-10 mg/5 mL solution Take 10 mL by mouth four (4) times daily as needed for Cough for up to 7 days. Max Daily Amount: 40 mL. 120 mL 0    fexofenadine HCl (ALLEGRA PO) Take  by mouth.      multivitamin (ONE A DAY) tablet Take 1 Tab by mouth daily. OTHER Allergy injections/Dr Bernabe      mometasone (NASONEX) 50 mcg/actuation nasal spray 2 Sprays by Both Nostrils route daily. (Patient not taking: No sig reported) 1 Container 11    fexofenadine-pseudoephedrine (ALLEGRA-D)  mg Tb12 Take 1 Tab by mouth every twelve (12) hours.  (Patient not taking: No sig reported)          Results for orders placed or performed in visit on 10/01/22   CBC/DIFF AMBIGUOUS DEFAULT   Result Value Ref Range    WBC 4.9 3.4 - 10.8 x10E3/uL    RBC 4.75 4.14 - 5.80 x10E6/uL    HGB 14.4 13.0 - 17.7 g/dL    HCT 42.0 37.5 - 51.0 %    MCV 88 79 - 97 fL    MCH 30.3 26.6 - 33.0 pg    MCHC 34.3 31.5 - 35.7 g/dL    RDW 11.9 11.6 - 15.4 %    PLATELET 732 036 - 691 x10E3/uL    NEUTROPHILS 51 Not Estab. %    Lymphocytes 38 Not Estab. %    MONOCYTES 8 Not Estab. %    EOSINOPHILS 2 Not Estab. %    BASOPHILS 1 Not Estab. %    ABS. NEUTROPHILS 2.5 1.4 - 7.0 x10E3/uL    Abs Lymphocytes 1.9 0.7 - 3.1 x10E3/uL    ABS. MONOCYTES 0.4 0.1 - 0.9 x10E3/uL    ABS. EOSINOPHILS 0.1 0.0 - 0.4 x10E3/uL    ABS. BASOPHILS 0.1 0.0 - 0.2 x10E3/uL    IMMATURE GRANULOCYTES 0 Not Estab. %    ABS. IMM. GRANS. 0.0 0.0 - 0.1 R87I2/UM   METABOLIC PANEL, COMPREHENSIVE   Result Value Ref Range    Glucose 64 (L) 70 - 99 mg/dL    BUN 16 6 - 24 mg/dL    Creatinine 1.27 0.76 - 1.27 mg/dL    eGFR 69 >59 mL/min/1.73    BUN/Creatinine ratio 13 9 - 20    Sodium 142 134 - 144 mmol/L    Potassium 4.7 3.5 - 5.2 mmol/L    Chloride 102 96 - 106 mmol/L    CO2 25 20 - 29 mmol/L    Calcium 9.6 8.7 - 10.2 mg/dL    Protein, total 6.7 6.0 - 8.5 g/dL    Albumin 4.5 4.0 - 5.0 g/dL    GLOBULIN, TOTAL 2.2 1.5 - 4.5 g/dL    A-G Ratio 2.0 1.2 - 2.2    Bilirubin, total 0.4 0.0 - 1.2 mg/dL    Alk.  phosphatase 49 44 - 121 IU/L    AST (SGOT) 23 0 - 40 IU/L    ALT (SGPT) 13 0 - 44 IU/L   LIPID PANEL   Result Value Ref Range    Cholesterol, total 197 100 - 199 mg/dL    Triglyceride 55 0 - 149 mg/dL    HDL Cholesterol 80 >39 mg/dL    VLDL, calculated 10 5 - 40 mg/dL    LDL, calculated 107 (H) 0 - 99 mg/dL   PSA, DIAGNOSTIC (PROSTATE SPECIFIC AG)   Result Value Ref Range    Prostate Specific Ag 0.4 0.0 - 4.0 ng/mL   CVD REPORT   Result Value Ref Range    INTERPRETATION Note    SPECIMEN STATUS REPORT   Result Value Ref Range    SPECIMEN STATUS REPORT COMMENT           Objective:     Visit Vitals  /69 (BP 1 Location: Left arm, BP Patient Position: Sitting, BP Cuff Size: Adult)   Pulse (!) 56   Temp 97.1 °F (36.2 °C) (Temporal)   Resp 20   Ht 5' 6\" (1.676 m)   Wt 194 lb (88 kg)   SpO2 98%   BMI 31.31 kg/m²     The patient appears well, alert, oriented x 3, in no distress. ENT normal.  Neck supple. No adenopathy or thyromegaly. JOHN. Lungs are clear, good air entry, no wheezes, rhonchi or rales. S1 and S2 normal, no murmurs, regular rate and rhythm. Abdomen is soft without tenderness, guarding, mass or organomegaly. Extremities show no edema, normal peripheral pulses. Neurological is normal without focal findings. Assessment/Plan:   healthy adult male  lose weight, increase physical activity. ICD-10-CM ICD-9-CM    1. Physical exam  Z00.00 V70.9       2. Mild intermittent reactive airway disease without complication  K94.73 027.38 Controlled on Flovent as needed      3. Cough, unspecified type  R05.9 786.2 guaiFENesin-codeine (Cheratussin AC) 100-10 mg/5 mL solution      4. Encounter for immunization  Z23 V03.89 ADMIN INFLUENZA VIRUS VAC      INFLUENZA, FLUARIX, FLULAVAL, FLUZONE (AGE 6 MO+), AFLURIA(AGE 3Y+) IM, PF, 0.5 ML        Patient has moved to [facility] and is working on finding a primary care physician in that area. He will also find a gastroenterologist so that he can have his colonoscopy. Follow-up and Dispositions    Return if symptoms worsen or fail to improve.

## 2022-12-09 ENCOUNTER — PATIENT MESSAGE (OUTPATIENT)
Dept: INTERNAL MEDICINE CLINIC | Age: 52
End: 2022-12-09

## 2022-12-09 RX ORDER — AZITHROMYCIN 250 MG/1
TABLET, FILM COATED ORAL
Qty: 6 TABLET | Refills: 0 | Status: SHIPPED | OUTPATIENT
Start: 2022-12-09 | End: 2022-12-14

## 2022-12-09 NOTE — TELEPHONE ENCOUNTER
----- Message from [facility] sent at 12/9/2022  6:05 AM EST -----  Regarding: something in my throat  Dr. Roxana Greco: Leigh Soto. I hope you are well. It's the time of year and I thought I would escape the bugs. I've been doing Vitamin C, D, the Singulair, and added the Flonase back in. Wed. night I started sneezing & sensed more drainage. So, I added a Zyrtec at night. Yesterday, I made it through school but by last night I was feeling very drained. I took the Vertussin with Codeine to help me sleep. Today, Friday morning, I woke up and I have a cotton ball in my throat and it burns when I cough. I also experienced a strong stomach cramp, which passed. My lungs sound and feel fine. I don't have a fever. I'm contacting you because you know my history and hoping you can assist.  I haven't found a new PCP yet. I won't be able to check this because I will be in school. My new pharmacy address is [facility] [employer], [address]. Thank you!   Unknown Aas

## 2022-12-13 NOTE — TELEPHONE ENCOUNTER
Regarding: something in my throat  I just wanted to update. It seems this bug roared in on me Saturday. I had fever & the \"gunk\" is definitely in my lungs. I've added albeuterol & flovent inhalers and today I will start Mucinex DM. I am still experiencing times where the fever spikes. Tomorrow is my last day of the z-pack. I'm addressing fever with ibuprofen & shreya-seltzer. Thank you.    Blade Victoria

## 2022-12-18 DIAGNOSIS — R05.9 COUGH, UNSPECIFIED TYPE: ICD-10-CM

## 2022-12-18 RX ORDER — CODEINE PHOSPHATE AND GUAIFENESIN 10; 100 MG/5ML; MG/5ML
10 SOLUTION ORAL
Qty: 120 ML | Refills: 0 | Status: SHIPPED | OUTPATIENT
Start: 2022-12-18 | End: 2022-12-25

## 2023-02-17 ENCOUNTER — TELEPHONE (OUTPATIENT)
Age: 53
End: 2023-02-17

## 2023-02-17 NOTE — TELEPHONE ENCOUNTER
----- Message from [facility] sent at 2/17/2023  7:09 AM EST -----  Regarding: seeking assistance with another bug  Funmilayo Zamora 59. I hope you are doing well. We have been, but in the last 2 days, my allergies started reacting. I made it through to last night when I had a fever, drainage and cough. The fever broke last night, and I am still coughing with the drainage being caught in my cough. I do feel drained and exhausted this morning to the point that I am not going into work. I did use my inhalers and take ibuprofen to manage the fever if it returns. Is there anything else you might be able to do? Also, in light of the cough, could you please authorize a refill on the  Codeine-Gauifen cough syrup? Thank you!   Sincerely,   Becca aJckson

## 2023-02-21 RX ORDER — ALBUTEROL SULFATE 90 UG/1
2 AEROSOL, METERED RESPIRATORY (INHALATION) EVERY 6 HOURS PRN
Qty: 18 G | Refills: 5 | Status: SHIPPED | OUTPATIENT
Start: 2023-02-21

## 2023-02-21 NOTE — TELEPHONE ENCOUNTER
----- Message from [facility] sent at 2023  6:55 PM EST -----  Regarding: ventolin inhaler refill  Dear Dr. Jesu Kirk,    Good evening! I tried refilling my albuterol inhaler, but the prescription has . Could I please get a refill for Ventolin HFA inhaler with dose counter 200 puffs? 90mcg. Please send it to [facility] at [address].     Thank you,  Brayan Hernandez

## 2023-07-17 ENCOUNTER — TELEPHONE (OUTPATIENT)
Age: 53
End: 2023-07-17

## 2023-07-17 NOTE — TELEPHONE ENCOUNTER
----- Message from [facility] sent at 2023  1:28 PM EDT -----  Regardin physical for  school physical form  Contact: [phone]  Dear Dr. Lexa Nevarez,     [address] [place] [address],[address]. I hope you are doing well. [place]. I am still looking for a doctor. I have been able to increase exercise again now that school is out. 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 Because I had a physical with you last year (2022), I am able to add this physical again into the school's wellness program.    When you have a moment, could you please complete the physician use only section noting last October's physical date? I greatly appreciate your time and patience! Sincerely,   Mike Barragan  [phone]  [email or web address]

## 2023-10-30 RX ORDER — MONTELUKAST SODIUM 10 MG/1
10 TABLET ORAL DAILY
Qty: 90 TABLET | Refills: 0 | Status: SHIPPED | OUTPATIENT
Start: 2023-10-30

## 2023-10-30 NOTE — TELEPHONE ENCOUNTER
Please refill if appropriate or refuse medication if not appropriate. PCP: Demetri Reilly MD     Last appt: 10/10/22    Last refill: 10/10/2022      No future appointments. From 10/10/22 note:  Patient has moved to [facility] and is working on finding a primary care physician in that area.        Requested Prescriptions     Pending Prescriptions Disp Refills    montelukast (SINGULAIR) 10 MG tablet [Pharmacy Med Name: MONTELUKAST 10MG TABLETS] 90 tablet      Sig: TAKE 1 TABLET BY MOUTH DAILY